# Patient Record
Sex: FEMALE | Race: WHITE | Employment: FULL TIME | URBAN - METROPOLITAN AREA
[De-identification: names, ages, dates, MRNs, and addresses within clinical notes are randomized per-mention and may not be internally consistent; named-entity substitution may affect disease eponyms.]

---

## 2022-07-24 LAB
CHOLESTEROL, TOTAL: 282 MG/DL (ref 100–199)
HDLC SERPL-MCNC: 73 MG/DL
LDL CHOLESTEROL CALCULATED: 190 MG/DL (ref 0–99)
TRIGL SERPL-MCNC: 108 MG/DL (ref 0–149)
TSH SERPL DL<=0.05 MIU/L-ACNC: 1.95 UIU/ML (ref 0.45–4.5)
VLDLC SERPL CALC-MCNC: 19 MG/DL (ref 5–40)

## 2022-07-26 ENCOUNTER — TELEPHONE (OUTPATIENT)
Dept: PULMONOLOGY | Age: 57
End: 2022-07-26

## 2022-07-26 NOTE — TELEPHONE ENCOUNTER
LVM 07/22/2022: NP call office back to schedule appt        LVM 02/26/22: NP referred by Dr. Vani Mijares from [facility] PCP

## 2022-07-29 ENCOUNTER — TELEPHONE (OUTPATIENT)
Dept: PULMONOLOGY | Age: 57
End: 2022-07-29

## 2022-07-29 NOTE — TELEPHONE ENCOUNTER
Pt called to schedule her new patient appointment and I informed her how far were scheduling out. Patient verbally understood and scheduled her for 09/19/2022 @ 11:30am.

## 2022-09-19 ENCOUNTER — INITIAL CONSULT (OUTPATIENT)
Dept: PULMONOLOGY | Age: 57
End: 2022-09-19
Payer: COMMERCIAL

## 2022-09-19 VITALS
WEIGHT: 203 LBS | BODY MASS INDEX: 33.82 KG/M2 | OXYGEN SATURATION: 96 % | DIASTOLIC BLOOD PRESSURE: 90 MMHG | SYSTOLIC BLOOD PRESSURE: 140 MMHG | HEART RATE: 93 BPM | HEIGHT: 65 IN

## 2022-09-19 DIAGNOSIS — E66.9 OBESITY (BMI 30-39.9): ICD-10-CM

## 2022-09-19 DIAGNOSIS — J84.9 ILD (INTERSTITIAL LUNG DISEASE) (HCC): ICD-10-CM

## 2022-09-19 DIAGNOSIS — R06.02 SOBOE (SHORTNESS OF BREATH ON EXERTION): ICD-10-CM

## 2022-09-19 PROCEDURE — 99204 OFFICE O/P NEW MOD 45 MIN: CPT | Performed by: INTERNAL MEDICINE

## 2022-09-19 RX ORDER — DULAGLUTIDE 0.75 MG/.5ML
0.75 INJECTION, SOLUTION SUBCUTANEOUS WEEKLY
COMMUNITY
Start: 2022-05-09

## 2022-09-19 RX ORDER — LISINOPRIL 5 MG/1
TABLET ORAL
COMMUNITY
Start: 2022-09-16

## 2022-09-19 RX ORDER — FUROSEMIDE 20 MG/1
TABLET ORAL
COMMUNITY
Start: 2022-06-16

## 2022-09-19 RX ORDER — HYDROCHLOROTHIAZIDE 12.5 MG/1
TABLET ORAL
COMMUNITY
Start: 2022-08-20

## 2022-09-19 ASSESSMENT — SLEEP AND FATIGUE QUESTIONNAIRES
HOW LIKELY ARE YOU TO NOD OFF OR FALL ASLEEP WHILE SITTING QUIETLY AFTER LUNCH WITHOUT ALCOHOL: 1
HOW LIKELY ARE YOU TO NOD OFF OR FALL ASLEEP WHILE SITTING AND READING: 1
HOW LIKELY ARE YOU TO NOD OFF OR FALL ASLEEP WHILE WATCHING TV: 1
HOW LIKELY ARE YOU TO NOD OFF OR FALL ASLEEP WHEN YOU ARE A PASSENGER IN A CAR FOR AN HOUR WITHOUT A BREAK: 0
ESS TOTAL SCORE: 4
HOW LIKELY ARE YOU TO NOD OFF OR FALL ASLEEP WHILE SITTING INACTIVE IN A PUBLIC PLACE: 0
HOW LIKELY ARE YOU TO NOD OFF OR FALL ASLEEP IN A CAR, WHILE STOPPED FOR A FEW MINUTES IN TRAFFIC: 0
HOW LIKELY ARE YOU TO NOD OFF OR FALL ASLEEP WHILE SITTING AND TALKING TO SOMEONE: 0
HOW LIKELY ARE YOU TO NOD OFF OR FALL ASLEEP WHILE LYING DOWN TO REST IN THE AFTERNOON WHEN CIRCUMSTANCES PERMIT: 1
NECK CIRCUMFERENCE (INCHES): 15.5

## 2022-09-19 ASSESSMENT — ENCOUNTER SYMPTOMS
SHORTNESS OF BREATH: 1
EYE DISCHARGE: 0
COUGH: 0
EYE ITCHING: 0
ABDOMINAL PAIN: 0
ABDOMINAL DISTENTION: 0

## 2022-09-19 NOTE — PROGRESS NOTES
Kristin Alexandre  1965  Referring Provider: Kelby Spurling, MD    Subjective:     Chief Complaint   Patient presents with    New Patient     ILD          HPI  Aurelia Arreguin is a 65 y.o. female has been referred for the ILD. She was recently in the hospital where according to the patient she had a CT chest which showed multifocal pneumonia with sepsis. It was in April 2022. She has h/o smoking. She works as Physical therapy Assistant. She has occasional cough, no phlegm, no hemoptysis, no loss of weight, good appetite, SOBOE some. She is not on oxygen. She is not on any inhalers. She is not sure if snores or stop breathing in the night. She goes to bed at 10 PM and gets up at 5 AM and she is not tired. She has no morning headaches. She has no EDS. Current Outpatient Medications   Medication Sig Dispense Refill    hydroCHLOROthiazide (HYDRODIURIL) 12.5 MG tablet TAKE 1 TABLET BY MOUTH ONCE DAILY      lisinopril (PRINIVIL;ZESTRIL) 5 MG tablet       Dulaglutide (TRULICITY) 5.11 MS/5.9CS SOPN Inject 0.75 mg into the skin once a week      furosemide (LASIX) 20 MG tablet TAKE 1 TABLET BY MOUTH ONCE DAILY (Patient not taking: Reported on 9/19/2022)      potassium chloride (KLOR-CON M) 20 MEQ extended release tablet  (Patient not taking: Reported on 9/19/2022)      atorvastatin (LIPITOR) 20 MG tablet  (Patient not taking: Reported on 9/19/2022)      losartan-hydrochlorothiazide (HYZAAR) 50-12.5 MG per tablet  (Patient not taking: Reported on 9/19/2022)      glimepiride (AMARYL) 2 MG tablet  (Patient not taking: Reported on 9/19/2022)      metFORMIN, MOD, (GLUMETZA) 1000 MG extended release tablet  (Patient not taking: Reported on 9/19/2022)       No current facility-administered medications for this visit. Allergies   Allergen Reactions    Nifedipine Shortness Of Breath    Penicillins Anaphylaxis    Metformin Other (See Comments)     Other reaction(s):  Other - comment required  Kidney function affected  Kidney function affected         Past Medical History:   Diagnosis Date    Hypertension     Type 2 diabetes mellitus without complication ([facility] [address]        Past Surgical History:   Procedure Laterality Date    COLONOSCOPY         Social History     Socioeconomic History    Marital status: Unknown   Tobacco Use    Smoking status: Never   Substance and Sexual Activity    Alcohol use: No       Review of Systems   Constitutional:  Negative for fatigue. HENT:  Negative for congestion and postnasal drip. Eyes:  Negative for discharge and itching. Respiratory:  Positive for shortness of breath. Negative for cough. Cardiovascular:  Negative for chest pain and leg swelling. Gastrointestinal:  Negative for abdominal distention and abdominal pain. Endocrine: Negative for cold intolerance and heat intolerance. Genitourinary:  Negative for enuresis and frequency. Musculoskeletal:  Negative for arthralgias. Allergic/Immunologic: Negative for environmental allergies and food allergies. Neurological:  Negative for light-headedness and headaches. Hematological:  Negative for adenopathy. Psychiatric/Behavioral:  Negative for agitation and behavioral problems. Objective:   BP (!) 148/100   Pulse 93   Ht 5' 5\" (1.651 m)   Wt 203 lb (92.1 kg)   SpO2 96%   BMI 33.78 kg/m²   Body mass index is 33.78 kg/m². Sleep Medicine 9/19/2022   Sitting and reading 1   Watching TV 1   Sitting, inactive in a public place (e.g. a theatre or a meeting) 0   As a passenger in a car for an hour without a break 0   Lying down to rest in the afternoon when circumstances permit 1   Sitting and talking to someone 0   Sitting quietly after a lunch without alcohol 1   In a car, while stopped for a few minutes in traffic 0   [place] Sleepiness Score 4   Neck circumference (Inches) 15.5     Mallampati 3    Physical Exam  Vitals reviewed. Constitutional:       Appearance: Normal appearance. HENT:      Head: Normocephalic and atraumatic.

## 2022-10-20 ENCOUNTER — HOSPITAL ENCOUNTER (OUTPATIENT)
Dept: CT IMAGING | Age: 57
Discharge: HOME | End: 2022-10-20
Payer: COMMERCIAL

## 2022-10-20 ENCOUNTER — HOSPITAL ENCOUNTER (OUTPATIENT)
Dept: PULMONOLOGY | Age: 57
Discharge: HOME | End: 2022-10-20
Payer: COMMERCIAL

## 2022-10-20 DIAGNOSIS — J84.9 ILD (INTERSTITIAL LUNG DISEASE) (HCC): ICD-10-CM

## 2022-10-20 DIAGNOSIS — R06.02 SOBOE (SHORTNESS OF BREATH ON EXERTION): ICD-10-CM

## 2022-10-20 LAB
DISTANCE WALKED: 885 FT
DLCO %PRED: 68 %
DLCO PRED: NORMAL
DLCO/VA %PRED: NORMAL
DLCO/VA PRED: NORMAL
DLCO/VA: NORMAL
DLCO: NORMAL
EXPIRATORY TIME-POST: NORMAL
EXPIRATORY TIME: NORMAL
FEF 25-75% %CHNG: NORMAL
FEF 25-75% %PRED-POST: NORMAL
FEF 25-75% %PRED-PRE: NORMAL
FEF 25-75% PRED: NORMAL
FEF 25-75%-POST: NORMAL
FEF 25-75%-PRE: NORMAL
FEV1 %PRED-POST: 52 %
FEV1 %PRED-PRE: 46 %
FEV1 PRED: NORMAL
FEV1-POST: NORMAL
FEV1-PRE: NORMAL
FEV1/FVC %PRED-POST: NORMAL
FEV1/FVC %PRED-PRE: NORMAL
FEV1/FVC PRED: NORMAL
FEV1/FVC-POST: 92 %
FEV1/FVC-PRE: 87 %
FVC %PRED-POST: NORMAL
FVC %PRED-PRE: NORMAL
FVC PRED: NORMAL
FVC-POST: NORMAL
FVC-PRE: NORMAL
GAW %PRED: NORMAL
GAW PRED: NORMAL
GAW: NORMAL
IC %PRED: NORMAL
IC PRED: NORMAL
IC: NORMAL
MEP: NORMAL
MIP: NORMAL
MVV %PRED-PRE: NORMAL
MVV PRED: NORMAL
MVV-PRE: NORMAL
PEF %PRED-POST: NORMAL
PEF %PRED-PRE: NORMAL
PEF PRED: NORMAL
PEF%CHNG: NORMAL
PEF-POST: NORMAL
PEF-PRE: NORMAL
RAW %PRED: NORMAL
RAW PRED: NORMAL
RAW: NORMAL
RV %PRED: NORMAL
RV PRED: NORMAL
RV: NORMAL
SPO2: 94 %
SVC %PRED: NORMAL
SVC PRED: NORMAL
SVC: NORMAL
TLC %PRED: 51 %
TLC PRED: NORMAL
TLC: NORMAL
VA %PRED: NORMAL
VA PRED: NORMAL
VA: NORMAL
VTG %PRED: NORMAL
VTG PRED: NORMAL
VTG: NORMAL

## 2022-10-20 PROCEDURE — 71250 CT THORAX DX C-: CPT

## 2022-10-20 PROCEDURE — 94618 PULMONARY STRESS TESTING: CPT

## 2022-10-20 PROCEDURE — 94060 EVALUATION OF WHEEZING: CPT

## 2022-10-20 PROCEDURE — 94726 PLETHYSMOGRAPHY LUNG VOLUMES: CPT

## 2022-10-20 PROCEDURE — 94729 DIFFUSING CAPACITY: CPT

## 2022-10-20 ASSESSMENT — PULMONARY FUNCTION TESTS
FEV1/FVC_PRE: 87
FEV1/FVC_POST: 92
FEV1_PERCENT_PREDICTED_PRE: 46
FEV1_PERCENT_PREDICTED_POST: 52

## 2022-10-20 NOTE — PROGRESS NOTES
[facility] Pulmonary Function Lab - Six Minute Walk  Test Performed on: Room Air_X__ Oxygen at _____ lpm via N/C  Assist Device Used During Test:    None__X__ Cane____ Walker___   Shortness of Breath - Lorna's Scale  0 Nothing at all 5 Severe    0.5 Very very slight 6    1 Very slight 7 Very severe   2 Slight 8     3 Moderate 9 Very very severe   4 Somewhat severe 10 Maximal      Time SpO2 Heart Rate Respiratory Rate Modified Lornas Scale Other      Baseline      94           %  60 20 0           1 minute                 96            % 107   1           2 minutes           95            %  122  2           3 minutes           94             %  136     3           4 minutes           94           %  139      3           5 minutes     95             %  143      4           6 minutes        93             %  140     4        Recovery   x 1 Min              93            %  126 30  3           Recovery   x 2 Min              97             %  117       2         Number of Laps_5_ X 170 feet _850__+ _35_ additional feet = Total _885_feet  Stopped or paused before 6 minutes?  No__X__ Yes _____   Pre Blood Pressure: _196_ / _95__    Post Blood Pressure:_ 239 _/_119__  Interpretation:

## 2022-11-02 ENCOUNTER — OFFICE VISIT (OUTPATIENT)
Dept: PULMONOLOGY | Age: 57
End: 2022-11-02
Payer: COMMERCIAL

## 2022-11-02 VITALS
SYSTOLIC BLOOD PRESSURE: 158 MMHG | OXYGEN SATURATION: 97 % | DIASTOLIC BLOOD PRESSURE: 98 MMHG | WEIGHT: 211 LBS | HEART RATE: 95 BPM | BODY MASS INDEX: 35.16 KG/M2 | HEIGHT: 65 IN

## 2022-11-02 DIAGNOSIS — E66.9 OBESITY (BMI 30-39.9): ICD-10-CM

## 2022-11-02 DIAGNOSIS — J84.9 ILD (INTERSTITIAL LUNG DISEASE) (HCC): ICD-10-CM

## 2022-11-02 DIAGNOSIS — R06.02 SOBOE (SHORTNESS OF BREATH ON EXERTION): Primary | ICD-10-CM

## 2022-11-02 DIAGNOSIS — I27.20 PULMONARY HYPERTENSION (HCC): ICD-10-CM

## 2022-11-02 PROCEDURE — 99214 OFFICE O/P EST MOD 30 MIN: CPT | Performed by: INTERNAL MEDICINE

## 2022-11-02 RX ORDER — HYDRALAZINE HYDROCHLORIDE 25 MG/1
TABLET, FILM COATED ORAL
COMMUNITY
Start: 2022-11-01

## 2022-11-02 ASSESSMENT — ENCOUNTER SYMPTOMS
BACK PAIN: 0
COUGH: 0
SHORTNESS OF BREATH: 0
ABDOMINAL DISTENTION: 0
EYE DISCHARGE: 0
EYE ITCHING: 0
ABDOMINAL PAIN: 0

## 2022-11-02 NOTE — PROGRESS NOTES
Gerson Jacky  1965  Referring Provider: Nasrin Messina MD    Subjective:     Chief Complaint   Patient presents with    Results       HPI  Mt Oropeza is a 65 y.o. female has come back as a follow up. She had suspected ILD. She has minimal symptoms. She had a HRCT of chest done on 10/20/22 showed:    1. There are dense bibasilar ground-glass opacities with associated   interlobular septal thickening. Given additional findings of mild   cardiomegaly and trace bilateral pleural effusions, findings are suggestive   of pulmonary edema. Prior imaging is not available for comparison, however   given reported history of bilateral consolidations dating back to 04/25/2022   CTA chest, these findings could also represent recurrent multifocal pneumonia   or NSIP, although not classic. Less likely differential consideration is   desquamative interstitial pneumonia (no significant cyst formation). 2.  Moderate body wall edema and diffuse skin thickening, compatible with   volume overload. 3.  The main pulmonary artery is dilated measuring up to 3.5 cm, which can be   seen in pulmonary hypertension. 4.  Nonspecific mildly prominent right axillary lymph nodes     She is on Lasix and HCTZ. Her PFT done on 10/20/22 showed that she has restrictive lung disease with a TLC of 51% and DLCO is 68%. Her 6 MWT done showed good walking distance and no desaturation. Current Outpatient Medications   Medication Sig Dispense Refill    hydrALAZINE (APRESOLINE) 25 MG tablet TAKE 1 TABLET BY MOUTH 3 TIMES A DAY.       furosemide (LASIX) 20 MG tablet       lisinopril (PRINIVIL;ZESTRIL) 5 MG tablet       Dulaglutide (TRULICITY) 5.83 UZ/5.2DN SOPN Inject 0.75 mg into the skin once a week      atorvastatin (LIPITOR) 20 MG tablet       hydroCHLOROthiazide (HYDRODIURIL) 12.5 MG tablet TAKE 1 TABLET BY MOUTH ONCE DAILY (Patient not taking: Reported on 11/2/2022)      potassium chloride (KLOR-CON M) 20 MEQ extended release tablet  (Patient not taking: No sig reported)      losartan-hydrochlorothiazide (HYZAAR) 50-12.5 MG per tablet  (Patient not taking: No sig reported)      glimepiride (AMARYL) 2 MG tablet  (Patient not taking: No sig reported)      metFORMIN, MOD, (GLUMETZA) 1000 MG extended release tablet  (Patient not taking: No sig reported)       No current facility-administered medications for this visit. Allergies   Allergen Reactions    Nifedipine Shortness Of Breath    Penicillins Anaphylaxis    Metformin Other (See Comments)     Other reaction(s): Other - comment required  Kidney function affected  Kidney function affected         Past Medical History:   Diagnosis Date    Hypertension     Type 2 diabetes mellitus without complication ([facility] [address]        Past Surgical History:   Procedure Laterality Date    COLONOSCOPY         Social History     Socioeconomic History    Marital status: Unknown     Spouse name: None    Number of children: None    Years of education: None    Highest education level: None   Tobacco Use    Smoking status: Never    Smokeless tobacco: Never   Substance and Sexual Activity    Alcohol use: No       Review of Systems   Constitutional:  Negative for fatigue. HENT:  Negative for congestion and postnasal drip. Eyes:  Negative for discharge and itching. Respiratory:  Negative for cough and shortness of breath. Cardiovascular:  Negative for chest pain and leg swelling. Gastrointestinal:  Negative for abdominal distention and abdominal pain. Endocrine: Negative for cold intolerance and heat intolerance. Genitourinary:  Negative for enuresis and frequency. Musculoskeletal:  Negative for arthralgias and back pain. Allergic/Immunologic: Negative for environmental allergies and food allergies. Neurological:  Negative for light-headedness and headaches. Hematological:  Negative for adenopathy. Psychiatric/Behavioral:  Negative for agitation and behavioral problems.       Objective:   BP (!) 184/110   Pulse 95   Ht 5' 5\" (1.651 m)   Wt 211 lb (95.7 kg)   SpO2 97%   BMI 35.11 kg/m²   Body mass index is 35.11 kg/m². Sleep Medicine 9/19/2022   Sitting and reading 1   Watching TV 1   Sitting, inactive in a public place (e.g. a theatre or a meeting) 0   As a passenger in a car for an hour without a break 0   Lying down to rest in the afternoon when circumstances permit 1   Sitting and talking to someone 0   Sitting quietly after a lunch without alcohol 1   In a car, while stopped for a few minutes in traffic 0   [place] Sleepiness Score 4   Neck circumference (Inches) 15.5     Mallampati 3    Physical Exam  Vitals reviewed. Constitutional:       Appearance: Normal appearance. HENT:      Head: Normocephalic and atraumatic. Nose: Nose normal.      Mouth/Throat:      Mouth: Mucous membranes are moist.   Eyes:      Extraocular Movements: Extraocular movements intact. Pupils: Pupils are equal, round, and reactive to light. Cardiovascular:      Rate and Rhythm: Normal rate and regular rhythm. Pulses: Normal pulses. Heart sounds: Normal heart sounds. Pulmonary:      Effort: Pulmonary effort is normal.      Breath sounds: Normal breath sounds. Abdominal:      General: Abdomen is flat. Palpations: Abdomen is soft. Musculoskeletal:         General: Normal range of motion. Cervical back: Normal range of motion and neck supple. Skin:     General: Skin is warm and dry. Neurological:      General: No focal deficit present. Mental Status: She is alert and oriented to person, place, and time.    Psychiatric:         Mood and Affect: Mood normal.         Behavior: Behavior normal.       Radiology: None    Assessment and Plan     Problem List          Circulatory    Pulmonary hypertension ([address]      At this time it appears to be sec to the ILD and possible diastolic dysfunction  I need an ECHO            Respiratory    ILD (interstitial lung disease) ([address] Appears to be NSIP with minimla symptoms  Repeat HRCT of chest in 1 year  PFT and a 6 MWT in 1 year  Advised ECHO now         Relevant Orders    Full PFT Study With Bronchodilator    6 Minute Walk Test       Other    Obesity (BMI 30-39. 9)      Advised to loose weight with diet and exercise           Relevant Medications    glimepiride (AMARYL) 2 MG tablet    metFORMIN, MOD, (GLUMETZA) 1000 MG extended release tablet    Dulaglutide (TRULICITY) 8.61 UM/0.8ZB SOPN    SOBOE (shortness of breath on exertion) - Primary      Minimal with no mdesaturation         Relevant Orders    ECHO Complete 2D W Doppler W Color    Full PFT Study With Bronchodilator    6 Minute Walk Test    CT CHEST WO CONTRAST            Follow-Up:    Return in about 4 weeks (around 11/30/2022) for ECHO.      Progress notes sent to the referring Provider    Mj Marino MD MD  11/2/2022  11:06 AM

## 2022-11-02 NOTE — ASSESSMENT & PLAN NOTE
Appears to be NSIP with minimla symptoms  Repeat HRCT of chest in 1 year  PFT and a 6 MWT in 1 year  Advised ECHO now

## 2022-11-18 ENCOUNTER — TELEPHONE (OUTPATIENT)
Dept: CARDIOLOGY CLINIC | Age: 57
End: 2022-11-18

## 2022-11-21 ENCOUNTER — TELEPHONE (OUTPATIENT)
Dept: CARDIOLOGY CLINIC | Age: 57
End: 2022-11-21

## 2022-11-21 NOTE — TELEPHONE ENCOUNTER
Left message for patient requesting a return call to schedule a consult with Himanshu for pulmonary hypertension per referral from [facility].

## 2022-11-23 ENCOUNTER — TELEPHONE (OUTPATIENT)
Dept: CARDIOLOGY CLINIC | Age: 57
End: 2022-11-23

## 2022-11-23 NOTE — TELEPHONE ENCOUNTER
Left message for patient requesting a return call to schedule a consult with Himanshu for pulmonary hypertension per referral from [facility].

## 2022-12-02 LAB — MAMMOGRAPHY, EXTERNAL: NORMAL

## 2022-12-21 PROBLEM — N17.9 ACUTE KIDNEY FAILURE, UNSPECIFIED (HCC): Status: ACTIVE | Noted: 2022-12-21

## 2022-12-21 PROBLEM — R80.1 PERSISTENT PROTEINURIA: Status: ACTIVE | Noted: 2022-12-21

## 2022-12-21 PROBLEM — I15.9 SECONDARY HYPERTENSION: Status: ACTIVE | Noted: 2022-12-21

## 2022-12-21 PROBLEM — N18.32 STAGE 3B CHRONIC KIDNEY DISEASE (HCC): Status: ACTIVE | Noted: 2022-12-21

## 2022-12-29 ENCOUNTER — HOSPITAL ENCOUNTER (OUTPATIENT)
Dept: ULTRASOUND IMAGING | Age: 57
Discharge: HOME | End: 2022-12-29
Payer: COMMERCIAL

## 2022-12-29 DIAGNOSIS — N17.9 ACUTE RENAL FAILURE, UNSPECIFIED ACUTE RENAL FAILURE TYPE (HCC): ICD-10-CM

## 2022-12-29 DIAGNOSIS — R80.1 PERSISTENT PROTEINURIA: ICD-10-CM

## 2022-12-29 DIAGNOSIS — N18.32 STAGE 3B CHRONIC KIDNEY DISEASE (HCC): ICD-10-CM

## 2022-12-29 PROCEDURE — 76775 US EXAM ABDO BACK WALL LIM: CPT

## 2023-10-10 PROBLEM — N18.32 TYPE 2 DIABETES MELLITUS WITH STAGE 3B CHRONIC KIDNEY DISEASE, WITHOUT LONG-TERM CURRENT USE OF INSULIN (HCC): Status: ACTIVE | Noted: 2023-10-10

## 2023-10-10 PROBLEM — E11.22 TYPE 2 DIABETES MELLITUS WITH STAGE 3B CHRONIC KIDNEY DISEASE, WITHOUT LONG-TERM CURRENT USE OF INSULIN (HCC): Status: ACTIVE | Noted: 2023-10-10

## 2023-12-05 PROBLEM — N04.9 NEPHROTIC SYNDROME: Status: ACTIVE | Noted: 2023-12-05

## 2024-04-22 ENCOUNTER — OFFICE VISIT (OUTPATIENT)
Dept: INTERNAL MEDICINE CLINIC | Age: 59
End: 2024-04-22
Payer: COMMERCIAL

## 2024-04-22 VITALS
HEIGHT: 65 IN | BODY MASS INDEX: 30.82 KG/M2 | DIASTOLIC BLOOD PRESSURE: 70 MMHG | SYSTOLIC BLOOD PRESSURE: 152 MMHG | WEIGHT: 185 LBS | OXYGEN SATURATION: 98 % | HEART RATE: 67 BPM

## 2024-04-22 DIAGNOSIS — N18.32 TYPE 2 DIABETES MELLITUS WITH STAGE 3B CHRONIC KIDNEY DISEASE, WITHOUT LONG-TERM CURRENT USE OF INSULIN (HCC): Primary | ICD-10-CM

## 2024-04-22 DIAGNOSIS — Z00.00 GENERAL MEDICAL EXAM: ICD-10-CM

## 2024-04-22 DIAGNOSIS — E11.22 TYPE 2 DIABETES MELLITUS WITH STAGE 3B CHRONIC KIDNEY DISEASE, WITHOUT LONG-TERM CURRENT USE OF INSULIN (HCC): Primary | ICD-10-CM

## 2024-04-22 DIAGNOSIS — I15.9 SECONDARY HYPERTENSION: ICD-10-CM

## 2024-04-22 DIAGNOSIS — Z12.31 ENCOUNTER FOR SCREENING MAMMOGRAM FOR MALIGNANT NEOPLASM OF BREAST: ICD-10-CM

## 2024-04-22 DIAGNOSIS — N18.32 STAGE 3B CHRONIC KIDNEY DISEASE (HCC): ICD-10-CM

## 2024-04-22 DIAGNOSIS — E78.5 HYPERLIPIDEMIA, UNSPECIFIED HYPERLIPIDEMIA TYPE: ICD-10-CM

## 2024-04-22 PROBLEM — N17.9 ACUTE KIDNEY FAILURE, UNSPECIFIED (HCC): Status: RESOLVED | Noted: 2022-12-21 | Resolved: 2024-04-22

## 2024-04-22 PROCEDURE — 99203 OFFICE O/P NEW LOW 30 MIN: CPT | Performed by: FAMILY MEDICINE

## 2024-04-22 RX ORDER — BLOOD SUGAR DIAGNOSTIC
STRIP MISCELLANEOUS
COMMUNITY
Start: 2024-03-21

## 2024-04-22 SDOH — ECONOMIC STABILITY: HOUSING INSECURITY
IN THE LAST 12 MONTHS, WAS THERE A TIME WHEN YOU DID NOT HAVE A STEADY PLACE TO SLEEP OR SLEPT IN A SHELTER (INCLUDING NOW)?: NO

## 2024-04-22 SDOH — ECONOMIC STABILITY: FOOD INSECURITY: WITHIN THE PAST 12 MONTHS, THE FOOD YOU BOUGHT JUST DIDN'T LAST AND YOU DIDN'T HAVE MONEY TO GET MORE.: NEVER TRUE

## 2024-04-22 SDOH — ECONOMIC STABILITY: FOOD INSECURITY: WITHIN THE PAST 12 MONTHS, YOU WORRIED THAT YOUR FOOD WOULD RUN OUT BEFORE YOU GOT MONEY TO BUY MORE.: NEVER TRUE

## 2024-04-22 SDOH — ECONOMIC STABILITY: INCOME INSECURITY: HOW HARD IS IT FOR YOU TO PAY FOR THE VERY BASICS LIKE FOOD, HOUSING, MEDICAL CARE, AND HEATING?: NOT HARD AT ALL

## 2024-04-22 ASSESSMENT — ENCOUNTER SYMPTOMS
SORE THROAT: 0
SHORTNESS OF BREATH: 0
ABDOMINAL PAIN: 0
CHEST TIGHTNESS: 0
CONSTIPATION: 0
DIARRHEA: 0
COLOR CHANGE: 0
VOMITING: 0

## 2024-04-22 ASSESSMENT — PATIENT HEALTH QUESTIONNAIRE - PHQ9
SUM OF ALL RESPONSES TO PHQ QUESTIONS 1-9: 0
SUM OF ALL RESPONSES TO PHQ QUESTIONS 1-9: 0
SUM OF ALL RESPONSES TO PHQ9 QUESTIONS 1 & 2: 0
SUM OF ALL RESPONSES TO PHQ QUESTIONS 1-9: 0
2. FEELING DOWN, DEPRESSED OR HOPELESS: NOT AT ALL
SUM OF ALL RESPONSES TO PHQ QUESTIONS 1-9: 0
1. LITTLE INTEREST OR PLEASURE IN DOING THINGS: NOT AT ALL

## 2024-04-22 NOTE — PROGRESS NOTES
Subjective:      Chief Complaint   Patient presents with    New Patient       HPI:  Angela Pabon is a 59 y.o. female who presents today to establish care with new provider and for management of chronic medical conditions as below.    HTN:  Is currently taking HCTZ, hydralazine and clonidine.  Has been tried on several medications.  States she has a history of extremely elevated HTN- was initially 200's.  States occasionally she gets a reading in 120-130's but are usually 150's.  Is following up with nephrology in a few weeks.      DM:  Is taking farxiga, glipizide and trulicity.  States she occasionally has hypoglycemia about once a month (60-70's).  Take glipizide once daily, but takes an extra half tab in evening occasionally if glucose reading is high.  Fasting glucose is usually 130-160.   States she used to take metformin but was discontinued 2/2 renal disease.    Last colonoscopy was at [facility], but does not remember the provider.  States results were normal.  Denies family history of colon cancer.     Last mammogram was last year.      Not seeing any specialists other than nephrology.     Feeling well today, no acute concerns.  Labs reviewed.        Past Medical History:   Diagnosis Date    Acute hypoxemic respiratory failure (HCC)     Essential hypertension     Human metapneumovirus (hMPV) pneumonia     Hypertension     Obesity     Sepsis (HCC)     Type 2 diabetes mellitus without complication (HCC)         Past Surgical History:   Procedure Laterality Date    BREAST BIOPSY      COLONOSCOPY      WISDOM TOOTH EXTRACTION         Social History     Tobacco Use    Smoking status: Never    Smokeless tobacco: Never   Substance Use Topics    Alcohol use: No        Review of Systems   Constitutional:  Negative for activity change, appetite change, chills, fever and unexpected weight change.   HENT:  Negative for congestion and sore throat.    Respiratory:  Negative for chest tightness and shortness of breath.

## 2024-05-23 PROBLEM — N18.4 CKD (CHRONIC KIDNEY DISEASE) STAGE 4, GFR 15-29 ML/MIN (HCC): Status: ACTIVE | Noted: 2024-05-23

## 2024-08-07 ENCOUNTER — TELEPHONE (OUTPATIENT)
Dept: INTERNAL MEDICINE CLINIC | Age: 59
End: 2024-08-07

## 2024-08-07 NOTE — TELEPHONE ENCOUNTER
Got a call from Joycelyn with [facility], need new script for patients [facility] [employer] [facility]ario sent over.

## 2024-08-09 RX ORDER — BLOOD SUGAR DIAGNOSTIC
STRIP MISCELLANEOUS
Qty: 100 EACH | Refills: 2 | Status: SHIPPED | OUTPATIENT
Start: 2024-08-09

## 2025-04-16 RX ORDER — ATORVASTATIN CALCIUM 80 MG/1
80 TABLET, FILM COATED ORAL DAILY
Qty: 90 TABLET | Refills: 1 | Status: SHIPPED | OUTPATIENT
Start: 2025-04-16

## 2025-05-06 ENCOUNTER — OFFICE VISIT (OUTPATIENT)
Dept: INTERNAL MEDICINE CLINIC | Age: 60
End: 2025-05-06
Payer: COMMERCIAL

## 2025-05-06 VITALS
HEART RATE: 90 BPM | OXYGEN SATURATION: 95 % | SYSTOLIC BLOOD PRESSURE: 156 MMHG | WEIGHT: 245 LBS | BODY MASS INDEX: 40.77 KG/M2 | DIASTOLIC BLOOD PRESSURE: 70 MMHG

## 2025-05-06 DIAGNOSIS — N18.32 TYPE 2 DIABETES MELLITUS WITH STAGE 3B CHRONIC KIDNEY DISEASE, WITHOUT LONG-TERM CURRENT USE OF INSULIN (HCC): Primary | ICD-10-CM

## 2025-05-06 DIAGNOSIS — Z12.31 ENCOUNTER FOR SCREENING MAMMOGRAM FOR MALIGNANT NEOPLASM OF BREAST: ICD-10-CM

## 2025-05-06 DIAGNOSIS — R60.1 GENERALIZED EDEMA: ICD-10-CM

## 2025-05-06 DIAGNOSIS — E78.5 HYPERLIPIDEMIA, UNSPECIFIED HYPERLIPIDEMIA TYPE: ICD-10-CM

## 2025-05-06 DIAGNOSIS — E55.9 VITAMIN D DEFICIENCY: ICD-10-CM

## 2025-05-06 DIAGNOSIS — I15.9 SECONDARY HYPERTENSION: ICD-10-CM

## 2025-05-06 DIAGNOSIS — N18.32 STAGE 3B CHRONIC KIDNEY DISEASE (HCC): ICD-10-CM

## 2025-05-06 DIAGNOSIS — E11.22 TYPE 2 DIABETES MELLITUS WITH STAGE 3B CHRONIC KIDNEY DISEASE, WITHOUT LONG-TERM CURRENT USE OF INSULIN (HCC): Primary | ICD-10-CM

## 2025-05-06 LAB
25(OH)D3 SERPL-MCNC: 6 NG/ML
ALBUMIN SERPL-MCNC: 2.7 G/DL (ref 3.4–5)
ALBUMIN/GLOB SERPL: 1.3 {RATIO} (ref 1.1–2.2)
ALP SERPL-CCNC: 100 U/L (ref 40–129)
ALT SERPL-CCNC: 22 U/L (ref 10–40)
ANION GAP SERPL CALCULATED.3IONS-SCNC: 10 MMOL/L (ref 3–16)
AST SERPL-CCNC: 31 U/L (ref 15–37)
BASOPHILS # BLD: 0 K/UL (ref 0–0.2)
BASOPHILS NFR BLD: 1.1 %
BILIRUB SERPL-MCNC: 0.7 MG/DL (ref 0–1)
BUN SERPL-MCNC: 25 MG/DL (ref 7–20)
CALCIUM SERPL-MCNC: 8.2 MG/DL (ref 8.3–10.6)
CHLORIDE SERPL-SCNC: 114 MMOL/L (ref 99–110)
CHOLEST SERPL-MCNC: 187 MG/DL (ref 0–199)
CO2 SERPL-SCNC: 21 MMOL/L (ref 21–32)
CREAT SERPL-MCNC: 2.7 MG/DL (ref 0.6–1.1)
DEPRECATED RDW RBC AUTO: 16.1 % (ref 12.4–15.4)
EOSINOPHIL # BLD: 0 K/UL (ref 0–0.6)
EOSINOPHIL NFR BLD: 0.7 %
GFR SERPLBLD CREATININE-BSD FMLA CKD-EPI: 20 ML/MIN/{1.73_M2}
GLUCOSE SERPL-MCNC: 124 MG/DL (ref 70–99)
HBA1C MFR BLD: 5 %
HCT VFR BLD AUTO: 27.9 % (ref 36–48)
HDLC SERPL-MCNC: 63 MG/DL (ref 40–60)
HGB BLD-MCNC: 9.4 G/DL (ref 12–16)
LDLC SERPL CALC-MCNC: 108 MG/DL
LYMPHOCYTES # BLD: 0.3 K/UL (ref 1–5.1)
LYMPHOCYTES NFR BLD: 8.3 %
MCH RBC QN AUTO: 28.1 PG (ref 26–34)
MCHC RBC AUTO-ENTMCNC: 33.6 G/DL (ref 31–36)
MCV RBC AUTO: 83.7 FL (ref 80–100)
MONOCYTES # BLD: 0.3 K/UL (ref 0–1.3)
MONOCYTES NFR BLD: 8.6 %
NEUTROPHILS # BLD: 3.2 K/UL (ref 1.7–7.7)
NEUTROPHILS NFR BLD: 81.3 %
NT-PROBNP SERPL-MCNC: ABNORMAL PG/ML (ref 0–124)
PLATELET # BLD AUTO: 265 K/UL (ref 135–450)
PMV BLD AUTO: 8.9 FL (ref 5–10.5)
POTASSIUM SERPL-SCNC: 4 MMOL/L (ref 3.5–5.1)
PROT SERPL-MCNC: 4.8 G/DL (ref 6.4–8.2)
RBC # BLD AUTO: 3.34 M/UL (ref 4–5.2)
SODIUM SERPL-SCNC: 145 MMOL/L (ref 136–145)
TRIGL SERPL-MCNC: 81 MG/DL (ref 0–150)
TSH SERPL DL<=0.005 MIU/L-ACNC: 5.73 UIU/ML (ref 0.27–4.2)
VLDLC SERPL CALC-MCNC: 16 MG/DL
WBC # BLD AUTO: 3.9 K/UL (ref 4–11)

## 2025-05-06 PROCEDURE — G2211 COMPLEX E/M VISIT ADD ON: HCPCS | Performed by: FAMILY MEDICINE

## 2025-05-06 PROCEDURE — 3044F HG A1C LEVEL LT 7.0%: CPT | Performed by: FAMILY MEDICINE

## 2025-05-06 PROCEDURE — 83036 HEMOGLOBIN GLYCOSYLATED A1C: CPT | Performed by: FAMILY MEDICINE

## 2025-05-06 PROCEDURE — 99215 OFFICE O/P EST HI 40 MIN: CPT | Performed by: FAMILY MEDICINE

## 2025-05-06 PROCEDURE — 36415 COLL VENOUS BLD VENIPUNCTURE: CPT | Performed by: FAMILY MEDICINE

## 2025-05-06 RX ORDER — HYDROCHLOROTHIAZIDE 12.5 MG/1
12.5 TABLET ORAL DAILY
Qty: 90 TABLET | Refills: 1 | Status: SHIPPED | OUTPATIENT
Start: 2025-05-06 | End: 2026-05-01

## 2025-05-06 RX ORDER — CLONIDINE HYDROCHLORIDE 0.1 MG/1
0.1 TABLET ORAL DAILY
Qty: 90 TABLET | Refills: 1 | Status: SHIPPED | OUTPATIENT
Start: 2025-05-06 | End: 2025-11-02

## 2025-05-06 RX ORDER — DAPAGLIFLOZIN 10 MG/1
10 TABLET, FILM COATED ORAL EVERY MORNING
Qty: 90 TABLET | Refills: 1 | Status: SHIPPED | OUTPATIENT
Start: 2025-05-06 | End: 2025-11-02

## 2025-05-06 RX ORDER — ATORVASTATIN CALCIUM 80 MG/1
80 TABLET, FILM COATED ORAL DAILY
Qty: 90 TABLET | Refills: 1 | Status: SHIPPED | OUTPATIENT
Start: 2025-05-06

## 2025-05-06 RX ORDER — HYDRALAZINE HYDROCHLORIDE 100 MG/1
100 TABLET, FILM COATED ORAL 2 TIMES DAILY
Qty: 180 TABLET | Refills: 1 | Status: SHIPPED | OUTPATIENT
Start: 2025-05-06 | End: 2026-05-01

## 2025-05-06 SDOH — ECONOMIC STABILITY: FOOD INSECURITY: WITHIN THE PAST 12 MONTHS, THE FOOD YOU BOUGHT JUST DIDN'T LAST AND YOU DIDN'T HAVE MONEY TO GET MORE.: NEVER TRUE

## 2025-05-06 SDOH — ECONOMIC STABILITY: FOOD INSECURITY: WITHIN THE PAST 12 MONTHS, YOU WORRIED THAT YOUR FOOD WOULD RUN OUT BEFORE YOU GOT MONEY TO BUY MORE.: NEVER TRUE

## 2025-05-06 ASSESSMENT — ENCOUNTER SYMPTOMS
CONSTIPATION: 0
ABDOMINAL PAIN: 0
VOMITING: 0
COLOR CHANGE: 0
CHEST TIGHTNESS: 0
SORE THROAT: 0
DIARRHEA: 0
SHORTNESS OF BREATH: 0

## 2025-05-06 ASSESSMENT — PATIENT HEALTH QUESTIONNAIRE - PHQ9
SUM OF ALL RESPONSES TO PHQ QUESTIONS 1-9: 0
1. LITTLE INTEREST OR PLEASURE IN DOING THINGS: NOT AT ALL
SUM OF ALL RESPONSES TO PHQ QUESTIONS 1-9: 0
SUM OF ALL RESPONSES TO PHQ QUESTIONS 1-9: 0
2. FEELING DOWN, DEPRESSED OR HOPELESS: NOT AT ALL
SUM OF ALL RESPONSES TO PHQ QUESTIONS 1-9: 0

## 2025-05-06 NOTE — PROGRESS NOTES
Physical Exam  Vitals and nursing note reviewed.   Constitutional:       General: She is not in acute distress.     Appearance: Normal appearance. She is obese. She is not ill-appearing or toxic-appearing.      Comments: Generalized non pitting edema in upper and lower extremities, face   HENT:      Head: Normocephalic and atraumatic.      Right Ear: External ear normal.      Left Ear: External ear normal.      Nose: Nose normal.      Mouth/Throat:      Pharynx: Oropharynx is clear.   Eyes:      General: No scleral icterus.        Right eye: No discharge.         Left eye: No discharge.      Extraocular Movements: Extraocular movements intact.      Conjunctiva/sclera: Conjunctivae normal.   Cardiovascular:      Rate and Rhythm: Normal rate and regular rhythm.      Heart sounds: Normal heart sounds.   Pulmonary:      Effort: Pulmonary effort is normal.      Breath sounds: Normal breath sounds. No wheezing or rales.   Musculoskeletal:         General: No deformity.      Cervical back: Normal range of motion and neck supple. No rigidity.   Skin:     General: Skin is warm and dry.      Findings: No rash.   Neurological:      General: No focal deficit present.      Mental Status: She is alert. Mental status is at baseline.      Motor: No weakness.   Psychiatric:         Mood and Affect: Mood normal.         Behavior: Behavior normal.            Assessment / Plan:      1. Type 2 diabetes mellitus with stage 3b chronic kidney disease, without long-term current use of insulin ([facility])  POC HbA1c 5.0 today.  Patient reporting regular hypoglycemia, will discontinue glipizide.  Will restart farxiga for CKD as patient not taking metformin or insulin- should have minimal risk of hypoglycemia.  Continue monitoring glucose regularly.    - Albumin/Creatinine Ratio, Urine  - POCT glycosylated hemoglobin (Hb A1C)  - dapagliflozin (FARXIGA) 10 MG tablet; Take 1 tablet by mouth every morning  Dispense: 90 tablet; Refill: 1    2.

## 2025-05-08 ENCOUNTER — TELEPHONE (OUTPATIENT)
Dept: INTERNAL MEDICINE CLINIC | Age: 60
End: 2025-05-08

## 2025-05-08 ENCOUNTER — RESULTS FOLLOW-UP (OUTPATIENT)
Dept: INTERNAL MEDICINE CLINIC | Age: 60
End: 2025-05-08

## 2025-05-08 DIAGNOSIS — R06.02 SHORTNESS OF BREATH: Primary | ICD-10-CM

## 2025-05-08 DIAGNOSIS — E03.9 HYPOTHYROIDISM, UNSPECIFIED TYPE: ICD-10-CM

## 2025-05-08 DIAGNOSIS — R60.1 GENERALIZED EDEMA: ICD-10-CM

## 2025-05-08 DIAGNOSIS — R63.5 WEIGHT GAIN: ICD-10-CM

## 2025-05-08 DIAGNOSIS — E55.9 VITAMIN D DEFICIENCY: ICD-10-CM

## 2025-05-08 DIAGNOSIS — N18.32 STAGE 3B CHRONIC KIDNEY DISEASE (HCC): ICD-10-CM

## 2025-05-08 DIAGNOSIS — R79.89 ELEVATED BRAIN NATRIURETIC PEPTIDE (BNP) LEVEL: ICD-10-CM

## 2025-05-08 RX ORDER — ERGOCALCIFEROL 1.25 MG/1
50000 CAPSULE, LIQUID FILLED ORAL WEEKLY
Qty: 12 CAPSULE | Refills: 3 | Status: SHIPPED | OUTPATIENT
Start: 2025-05-08 | End: 2025-06-10 | Stop reason: CLARIF

## 2025-05-08 RX ORDER — LEVOTHYROXINE SODIUM 25 UG/1
25 TABLET ORAL DAILY
Qty: 30 TABLET | Refills: 2 | Status: ON HOLD | OUTPATIENT
Start: 2025-05-08 | End: 2025-05-24 | Stop reason: HOSPADM

## 2025-05-08 RX ORDER — FUROSEMIDE 20 MG/1
20 TABLET ORAL DAILY
Qty: 30 TABLET | Refills: 0 | Status: ON HOLD | OUTPATIENT
Start: 2025-05-08 | End: 2025-05-24 | Stop reason: HOSPADM

## 2025-05-08 NOTE — TELEPHONE ENCOUNTER
Patient called, no answer.  Unidentified voicemail- left message to call back clinic.    Patient has several abnormal labs:  1.  Elevated BNP- patient likely has heart failure (we discussed this possibility at her appointment) that is causing many of her symptoms.  She needs an ECHO and she needs to see cardiology.  I've ordered both, I've also ordered her a daily dose of lasix for her to start until she can see a cardiologist.  We still need to be careful with the lasix due to her kidney function, but it should help with her swelling.  If she has any significant/acute worsening of SOB or her symptoms in general, I would advise her to go to ER for evaluation.   2.  Her thyroid lab is abnormal, which could also be contributing to her swelling/fatigue. I've started her on a low dose thyroid medication and this lab will need to be repeated in a few months.   3.  Her vitamin D is extremely low which will make her very tired.  I've started a once weekly dose that she needs to start taking.      She needs to follow up with Kyra Bustillos in about 2 months for repeat labs and reassessment.  I've ordered repeat labs for her to complete about a week before her appointment.  Let her know we'll call her with an appointment date as soon as we have Kyra's schedule worked out.      Thanks.

## 2025-05-08 NOTE — TELEPHONE ENCOUNTER
[facility] called with an PA for Dapagliflozin is denied by insurance. PA denial and appeal process was faxed to the office.

## 2025-05-12 NOTE — PROGRESS NOTES
Patient Name: Angela Pabon  : 1965  MRN# [id number]    REASON FOR VISIT:  CONSULT  Patient Active Problem List    Diagnosis Date Noted    Stage 3b chronic kidney disease ([facility]) 2022    Secondary hypertension 2022    Persistent proteinuria 2022    Pulmonary hypertension ([facility]) 2022    ILD (interstitial lung disease) ([facility]) 2022    Obesity (BMI 30-39.9) 2022    SOBOE (shortness of breath on exertion) 2022    CKD (chronic kidney disease) stage 4, GFR 15-29 ml/min ([facility]) 2024    Nephrotic syndrome 2023    Type 2 diabetes mellitus with stage 3b chronic kidney disease, without long-term current use of insulin ([facility]) 10/10/2023       LABS:  Lab Results   Component Value Date    CHOL 187 2025    TRIG 81 2025    HDL 63 (H) 2025    LDLDIRECT 86 2023    TSH 5.73 (H) 2025     Hemoglobin A1C   Date Value Ref Range Status   2025 5.0 % Final

## 2025-05-16 ENCOUNTER — APPOINTMENT (OUTPATIENT)
Dept: GENERAL RADIOLOGY | Age: 60
End: 2025-05-16
Payer: COMMERCIAL

## 2025-05-16 ENCOUNTER — HOSPITAL ENCOUNTER (INPATIENT)
Age: 60
LOS: 8 days | Discharge: HOME | End: 2025-05-24
Attending: INTERNAL MEDICINE | Admitting: STUDENT IN AN ORGANIZED HEALTH CARE EDUCATION/TRAINING PROGRAM
Payer: COMMERCIAL

## 2025-05-16 ENCOUNTER — INITIAL CONSULT (OUTPATIENT)
Dept: CARDIOLOGY CLINIC | Age: 60
End: 2025-05-16
Payer: COMMERCIAL

## 2025-05-16 VITALS
HEIGHT: 65 IN | BODY MASS INDEX: 40.48 KG/M2 | DIASTOLIC BLOOD PRESSURE: 68 MMHG | WEIGHT: 243 LBS | HEART RATE: 84 BPM | SYSTOLIC BLOOD PRESSURE: 136 MMHG

## 2025-05-16 DIAGNOSIS — N18.32 TYPE 2 DIABETES MELLITUS WITH STAGE 3B CHRONIC KIDNEY DISEASE, WITHOUT LONG-TERM CURRENT USE OF INSULIN (HCC): ICD-10-CM

## 2025-05-16 DIAGNOSIS — E11.22 TYPE 2 DIABETES MELLITUS WITH STAGE 3B CHRONIC KIDNEY DISEASE, WITHOUT LONG-TERM CURRENT USE OF INSULIN (HCC): ICD-10-CM

## 2025-05-16 DIAGNOSIS — R06.02 SOB (SHORTNESS OF BREATH): Primary | ICD-10-CM

## 2025-05-16 DIAGNOSIS — R79.89 ELEVATED TROPONIN: ICD-10-CM

## 2025-05-16 DIAGNOSIS — N18.4 CKD (CHRONIC KIDNEY DISEASE) STAGE 4, GFR 15-29 ML/MIN (HCC): ICD-10-CM

## 2025-05-16 DIAGNOSIS — E78.5 DYSLIPIDEMIA: ICD-10-CM

## 2025-05-16 DIAGNOSIS — R60.0 BILATERAL LEG EDEMA: ICD-10-CM

## 2025-05-16 DIAGNOSIS — R60.1 ANASARCA: ICD-10-CM

## 2025-05-16 DIAGNOSIS — I27.20 PULMONARY HYPERTENSION (HCC): ICD-10-CM

## 2025-05-16 DIAGNOSIS — N04.9 NEPHROTIC SYNDROME: ICD-10-CM

## 2025-05-16 DIAGNOSIS — E83.42 HYPOMAGNESEMIA: ICD-10-CM

## 2025-05-16 DIAGNOSIS — I10 PRIMARY HYPERTENSION: ICD-10-CM

## 2025-05-16 DIAGNOSIS — I50.9 ACUTE CONGESTIVE HEART FAILURE, UNSPECIFIED HEART FAILURE TYPE (HCC): Primary | ICD-10-CM

## 2025-05-16 DIAGNOSIS — E66.9 OBESITY (BMI 30-39.9): ICD-10-CM

## 2025-05-16 DIAGNOSIS — E87.6 HYPOKALEMIA: ICD-10-CM

## 2025-05-16 DIAGNOSIS — J84.9 ILD (INTERSTITIAL LUNG DISEASE) (HCC): ICD-10-CM

## 2025-05-16 LAB
ALBUMIN SERPL-MCNC: 2.6 G/DL (ref 3.4–5)
ALBUMIN/GLOB SERPL: 0.9 {RATIO} (ref 1.1–2.2)
ALP SERPL-CCNC: 111 U/L (ref 40–129)
ALT SERPL-CCNC: 32 U/L (ref 10–40)
ANION GAP SERPL CALCULATED.3IONS-SCNC: 13 MMOL/L (ref 9–17)
ANION GAP SERPL CALCULATED.3IONS-SCNC: 13 MMOL/L (ref 9–17)
AST SERPL-CCNC: 40 U/L (ref 15–37)
BILIRUB SERPL-MCNC: 0.7 MG/DL (ref 0–1)
BILIRUB UR QL STRIP: NEGATIVE
BNP SERPL-MCNC: ABNORMAL PG/ML (ref 0–125)
BUN SERPL-MCNC: 19 MG/DL (ref 7–20)
BUN SERPL-MCNC: 19 MG/DL (ref 7–20)
CALCIUM SERPL-MCNC: 8 MG/DL (ref 8.3–10.6)
CALCIUM SERPL-MCNC: 8.2 MG/DL (ref 8.3–10.6)
CASTS #/AREA URNS LPF: ABNORMAL /LPF
CHARACTER UR: ABNORMAL
CHLORIDE SERPL-SCNC: 111 MMOL/L (ref 99–110)
CHLORIDE SERPL-SCNC: 112 MMOL/L (ref 99–110)
CLARITY UR: CLEAR
CO2 SERPL-SCNC: 17 MMOL/L (ref 21–32)
CO2 SERPL-SCNC: 18 MMOL/L (ref 21–32)
COLOR UR: YELLOW
CREAT SERPL-MCNC: 2.7 MG/DL (ref 0.6–1.1)
CREAT SERPL-MCNC: 2.9 MG/DL (ref 0.6–1.1)
EKG ATRIAL RATE: 267 BPM
EKG DIAGNOSIS: NORMAL
EKG Q-T INTERVAL: 406 MS
EKG QRS DURATION: 70 MS
EKG QTC CALCULATION (BAZETT): 441 MS
EKG R AXIS: 86 DEGREES
EKG T AXIS: 91 DEGREES
EKG VENTRICULAR RATE: 71 BPM
EPI CELLS #/AREA URNS HPF: 2 /HPF
ERYTHROCYTE [DISTWIDTH] IN BLOOD BY AUTOMATED COUNT: 14.8 % (ref 11.7–14.9)
GFR, ESTIMATED: 17 ML/MIN/1.73M2
GFR, ESTIMATED: 18 ML/MIN/1.73M2
GLUCOSE SERPL-MCNC: 134 MG/DL (ref 74–99)
GLUCOSE SERPL-MCNC: 142 MG/DL (ref 74–99)
GLUCOSE UR STRIP-MCNC: 100 MG/DL
HCT VFR BLD AUTO: 28.6 % (ref 37–47)
HGB BLD-MCNC: 8.7 G/DL (ref 12.5–16)
HGB UR QL STRIP.AUTO: NEGATIVE
KETONES UR STRIP-MCNC: NEGATIVE MG/DL
LEUKOCYTE ESTERASE UR QL STRIP: NEGATIVE
MAGNESIUM SERPL-MCNC: 1.7 MG/DL (ref 1.8–2.4)
MCH RBC QN AUTO: 27.3 PG (ref 27–31)
MCHC RBC AUTO-ENTMCNC: 30.4 G/DL (ref 32–36)
MCV RBC AUTO: 89.7 FL (ref 78–100)
MUCOUS THREADS URNS QL MICRO: ABNORMAL
NITRITE UR QL STRIP: NEGATIVE
PH UR STRIP: 6 [PH] (ref 5–8)
PLATELET # BLD AUTO: 252 K/UL (ref 140–440)
PMV BLD AUTO: 10.7 FL (ref 7.5–11.1)
POTASSIUM SERPL-SCNC: 2.9 MMOL/L (ref 3.5–5.1)
POTASSIUM SERPL-SCNC: 3.5 MMOL/L (ref 3.5–5.1)
PROT SERPL-MCNC: 5.3 G/DL (ref 6.4–8.2)
PROT UR STRIP-MCNC: >=300 MG/DL
RBC # BLD AUTO: 3.19 M/UL (ref 4.2–5.4)
RBC #/AREA URNS HPF: 3 /HPF (ref 0–2)
SODIUM SERPL-SCNC: 141 MMOL/L (ref 136–145)
SODIUM SERPL-SCNC: 143 MMOL/L (ref 136–145)
SP GR UR STRIP: 1.02 (ref 1–1.03)
TROPONIN I SERPL HS-MCNC: 166 NG/L (ref 0–14)
TROPONIN I SERPL HS-MCNC: 187 NG/L (ref 0–14)
UROBILINOGEN UR STRIP-ACNC: 0.2 EU/DL (ref 0–1)
WBC #/AREA URNS HPF: 4 /HPF (ref 0–5)
WBC OTHER # BLD: 4.8 K/UL (ref 4–10.5)

## 2025-05-16 PROCEDURE — 83735 ASSAY OF MAGNESIUM: CPT

## 2025-05-16 PROCEDURE — 3044F HG A1C LEVEL LT 7.0%: CPT | Performed by: INTERNAL MEDICINE

## 2025-05-16 PROCEDURE — 93000 ELECTROCARDIOGRAM COMPLETE: CPT | Performed by: INTERNAL MEDICINE

## 2025-05-16 PROCEDURE — 6360000002 HC RX W HCPCS

## 2025-05-16 PROCEDURE — 94761 N-INVAS EAR/PLS OXIMETRY MLT: CPT

## 2025-05-16 PROCEDURE — 99285 EMERGENCY DEPT VISIT HI MDM: CPT

## 2025-05-16 PROCEDURE — 93010 ELECTROCARDIOGRAM REPORT: CPT | Performed by: INTERNAL MEDICINE

## 2025-05-16 PROCEDURE — 83880 ASSAY OF NATRIURETIC PEPTIDE: CPT

## 2025-05-16 PROCEDURE — 2500000003 HC RX 250 WO HCPCS: Performed by: INTERNAL MEDICINE

## 2025-05-16 PROCEDURE — 1200000000 HC SEMI PRIVATE

## 2025-05-16 PROCEDURE — 81001 URINALYSIS AUTO W/SCOPE: CPT

## 2025-05-16 PROCEDURE — 71045 X-RAY EXAM CHEST 1 VIEW: CPT

## 2025-05-16 PROCEDURE — 3075F SYST BP GE 130 - 139MM HG: CPT | Performed by: INTERNAL MEDICINE

## 2025-05-16 PROCEDURE — 76937 US GUIDE VASCULAR ACCESS: CPT

## 2025-05-16 PROCEDURE — 05HA33Z INSERTION OF INFUSION DEVICE INTO LEFT BRACHIAL VEIN, PERCUTANEOUS APPROACH: ICD-10-PCS | Performed by: STUDENT IN AN ORGANIZED HEALTH CARE EDUCATION/TRAINING PROGRAM

## 2025-05-16 PROCEDURE — C1751 CATH, INF, PER/CENT/MIDLINE: HCPCS

## 2025-05-16 PROCEDURE — 2709999900 HC NON-CHARGEABLE SUPPLY

## 2025-05-16 PROCEDURE — 85027 COMPLETE CBC AUTOMATED: CPT

## 2025-05-16 PROCEDURE — 36410 VNPNXR 3YR/> PHY/QHP DX/THER: CPT

## 2025-05-16 PROCEDURE — 84484 ASSAY OF TROPONIN QUANT: CPT

## 2025-05-16 PROCEDURE — 6360000002 HC RX W HCPCS: Performed by: INTERNAL MEDICINE

## 2025-05-16 PROCEDURE — 36415 COLL VENOUS BLD VENIPUNCTURE: CPT

## 2025-05-16 PROCEDURE — 6370000000 HC RX 637 (ALT 250 FOR IP)

## 2025-05-16 PROCEDURE — 99204 OFFICE O/P NEW MOD 45 MIN: CPT | Performed by: INTERNAL MEDICINE

## 2025-05-16 PROCEDURE — 6370000000 HC RX 637 (ALT 250 FOR IP): Performed by: INTERNAL MEDICINE

## 2025-05-16 PROCEDURE — 3078F DIAST BP <80 MM HG: CPT | Performed by: INTERNAL MEDICINE

## 2025-05-16 PROCEDURE — 80053 COMPREHEN METABOLIC PANEL: CPT

## 2025-05-16 PROCEDURE — 93005 ELECTROCARDIOGRAM TRACING: CPT | Performed by: INTERNAL MEDICINE

## 2025-05-16 PROCEDURE — 2580000003 HC RX 258: Performed by: INTERNAL MEDICINE

## 2025-05-16 PROCEDURE — 80048 BASIC METABOLIC PNL TOTAL CA: CPT

## 2025-05-16 RX ORDER — SODIUM CHLORIDE 0.9 % (FLUSH) 0.9 %
5-40 SYRINGE (ML) INJECTION PRN
Status: DISCONTINUED | OUTPATIENT
Start: 2025-05-16 | End: 2025-05-24 | Stop reason: HOSPADM

## 2025-05-16 RX ORDER — ACETAMINOPHEN 650 MG/1
650 SUPPOSITORY RECTAL EVERY 6 HOURS PRN
Status: DISCONTINUED | OUTPATIENT
Start: 2025-05-16 | End: 2025-05-24 | Stop reason: HOSPADM

## 2025-05-16 RX ORDER — CLONIDINE HYDROCHLORIDE 0.1 MG/1
0.1 TABLET ORAL DAILY
Status: DISCONTINUED | OUTPATIENT
Start: 2025-05-17 | End: 2025-05-24 | Stop reason: HOSPADM

## 2025-05-16 RX ORDER — ONDANSETRON 4 MG/1
4 TABLET, ORALLY DISINTEGRATING ORAL EVERY 8 HOURS PRN
Status: DISCONTINUED | OUTPATIENT
Start: 2025-05-16 | End: 2025-05-24 | Stop reason: HOSPADM

## 2025-05-16 RX ORDER — POLYETHYLENE GLYCOL 3350 17 G/17G
17 POWDER, FOR SOLUTION ORAL DAILY PRN
Status: DISCONTINUED | OUTPATIENT
Start: 2025-05-16 | End: 2025-05-24 | Stop reason: HOSPADM

## 2025-05-16 RX ORDER — ATORVASTATIN CALCIUM 40 MG/1
80 TABLET, FILM COATED ORAL NIGHTLY
Status: DISCONTINUED | OUTPATIENT
Start: 2025-05-17 | End: 2025-05-24 | Stop reason: HOSPADM

## 2025-05-16 RX ORDER — ACETAMINOPHEN 325 MG/1
650 TABLET ORAL EVERY 6 HOURS PRN
Status: DISCONTINUED | OUTPATIENT
Start: 2025-05-16 | End: 2025-05-24 | Stop reason: HOSPADM

## 2025-05-16 RX ORDER — ONDANSETRON 2 MG/ML
4 INJECTION INTRAMUSCULAR; INTRAVENOUS EVERY 6 HOURS PRN
Status: DISCONTINUED | OUTPATIENT
Start: 2025-05-16 | End: 2025-05-24 | Stop reason: HOSPADM

## 2025-05-16 RX ORDER — MAGNESIUM SULFATE IN WATER 40 MG/ML
2000 INJECTION, SOLUTION INTRAVENOUS ONCE
Status: COMPLETED | OUTPATIENT
Start: 2025-05-16 | End: 2025-05-16

## 2025-05-16 RX ORDER — SODIUM CHLORIDE 9 MG/ML
INJECTION, SOLUTION INTRAVENOUS PRN
Status: DISCONTINUED | OUTPATIENT
Start: 2025-05-16 | End: 2025-05-24 | Stop reason: HOSPADM

## 2025-05-16 RX ORDER — HYDROCHLOROTHIAZIDE 12.5 MG/1
12.5 TABLET ORAL DAILY
Status: DISCONTINUED | OUTPATIENT
Start: 2025-05-17 | End: 2025-05-18

## 2025-05-16 RX ORDER — HYDRALAZINE HYDROCHLORIDE 50 MG/1
100 TABLET, FILM COATED ORAL 2 TIMES DAILY
Status: DISCONTINUED | OUTPATIENT
Start: 2025-05-16 | End: 2025-05-24 | Stop reason: HOSPADM

## 2025-05-16 RX ORDER — SODIUM CHLORIDE 0.9 % (FLUSH) 0.9 %
5-40 SYRINGE (ML) INJECTION EVERY 12 HOURS SCHEDULED
Status: DISCONTINUED | OUTPATIENT
Start: 2025-05-16 | End: 2025-05-24 | Stop reason: HOSPADM

## 2025-05-16 RX ORDER — POTASSIUM CHLORIDE 7.45 MG/ML
10 INJECTION INTRAVENOUS ONCE
Status: DISCONTINUED | OUTPATIENT
Start: 2025-05-16 | End: 2025-05-22

## 2025-05-16 RX ORDER — HYDRALAZINE HYDROCHLORIDE 20 MG/ML
10 INJECTION INTRAMUSCULAR; INTRAVENOUS EVERY 4 HOURS PRN
Status: DISCONTINUED | OUTPATIENT
Start: 2025-05-16 | End: 2025-05-24 | Stop reason: HOSPADM

## 2025-05-16 RX ORDER — LABETALOL HYDROCHLORIDE 5 MG/ML
10 INJECTION, SOLUTION INTRAVENOUS EVERY 4 HOURS PRN
Status: DISCONTINUED | OUTPATIENT
Start: 2025-05-16 | End: 2025-05-24 | Stop reason: HOSPADM

## 2025-05-16 RX ORDER — FUROSEMIDE 10 MG/ML
40 INJECTION INTRAMUSCULAR; INTRAVENOUS ONCE
Status: COMPLETED | OUTPATIENT
Start: 2025-05-16 | End: 2025-05-16

## 2025-05-16 RX ORDER — FUROSEMIDE 10 MG/ML
40 INJECTION INTRAMUSCULAR; INTRAVENOUS 2 TIMES DAILY
Status: CANCELLED | OUTPATIENT
Start: 2025-05-16

## 2025-05-16 RX ORDER — LEVOTHYROXINE SODIUM 25 UG/1
25 TABLET ORAL
Status: DISCONTINUED | OUTPATIENT
Start: 2025-05-17 | End: 2025-05-22

## 2025-05-16 RX ORDER — CALCIUM GLUCONATE 20 MG/ML
2000 INJECTION, SOLUTION INTRAVENOUS ONCE
Status: COMPLETED | OUTPATIENT
Start: 2025-05-16 | End: 2025-05-16

## 2025-05-16 RX ORDER — ENOXAPARIN SODIUM 100 MG/ML
30 INJECTION SUBCUTANEOUS DAILY
Status: DISCONTINUED | OUTPATIENT
Start: 2025-05-17 | End: 2025-05-24 | Stop reason: HOSPADM

## 2025-05-16 RX ORDER — POTASSIUM CHLORIDE 1500 MG/1
40 TABLET, EXTENDED RELEASE ORAL ONCE
Status: COMPLETED | OUTPATIENT
Start: 2025-05-16 | End: 2025-05-16

## 2025-05-16 RX ORDER — POTASSIUM CHLORIDE 7.45 MG/ML
10 INJECTION INTRAVENOUS ONCE
Status: COMPLETED | OUTPATIENT
Start: 2025-05-16 | End: 2025-05-16

## 2025-05-16 RX ADMIN — CALCIUM GLUCONATE 2000 MG: 20 INJECTION, SOLUTION INTRAVENOUS at 17:52

## 2025-05-16 RX ADMIN — ACETAMINOPHEN 650 MG: 325 TABLET ORAL at 21:14

## 2025-05-16 RX ADMIN — POTASSIUM BICARBONATE 20 MEQ: 782 TABLET, EFFERVESCENT ORAL at 16:58

## 2025-05-16 RX ADMIN — SODIUM CHLORIDE, PRESERVATIVE FREE 10 ML: 5 INJECTION INTRAVENOUS at 21:14

## 2025-05-16 RX ADMIN — FUROSEMIDE 5 MG/HR: 10 INJECTION, SOLUTION INTRAMUSCULAR; INTRAVENOUS at 17:54

## 2025-05-16 RX ADMIN — HYDRALAZINE HYDROCHLORIDE 100 MG: 50 TABLET ORAL at 21:14

## 2025-05-16 RX ADMIN — MAGNESIUM SULFATE HEPTAHYDRATE 2000 MG: 40 INJECTION, SOLUTION INTRAVENOUS at 15:50

## 2025-05-16 RX ADMIN — LABETALOL HYDROCHLORIDE 10 MG: 5 INJECTION, SOLUTION INTRAVENOUS at 16:48

## 2025-05-16 RX ADMIN — POTASSIUM CHLORIDE 40 MEQ: 1500 TABLET, EXTENDED RELEASE ORAL at 15:52

## 2025-05-16 RX ADMIN — FUROSEMIDE 40 MG: 10 INJECTION, SOLUTION INTRAMUSCULAR; INTRAVENOUS at 15:47

## 2025-05-16 RX ADMIN — POTASSIUM CHLORIDE 10 MEQ: 7.46 INJECTION, SOLUTION INTRAVENOUS at 15:52

## 2025-05-16 RX ADMIN — HYDRALAZINE HYDROCHLORIDE 10 MG: 20 INJECTION INTRAMUSCULAR; INTRAVENOUS at 18:03

## 2025-05-16 ASSESSMENT — LIFESTYLE VARIABLES
HOW MANY STANDARD DRINKS CONTAINING ALCOHOL DO YOU HAVE ON A TYPICAL DAY: PATIENT DOES NOT DRINK
HOW OFTEN DO YOU HAVE A DRINK CONTAINING ALCOHOL: NEVER

## 2025-05-16 ASSESSMENT — PAIN SCALES - GENERAL
PAINLEVEL_OUTOF10: 2
PAINLEVEL_OUTOF10: 3

## 2025-05-16 ASSESSMENT — PAIN - FUNCTIONAL ASSESSMENT: PAIN_FUNCTIONAL_ASSESSMENT: 0-10

## 2025-05-16 NOTE — ED NOTES
Attempted to call PICC team twice to see when they will be available to start an IV on the patient. Unable to get a hold of them.

## 2025-05-16 NOTE — PATIENT INSTRUCTIONS
SOB: Etiology needs to be worked up.  Although she does carry no history of chronic renal failure, interstitial lung disease but currently she has anasarca and she is getting out of breath just walking 2 steps.  She was not even able to get up on the examination table for me to examine her properly.  Her serum proBNP level is significantly elevated and her EKG shows low voltage complexes I cannot exclude the possibility of any pericardial effusion.  Other than EKG we do not have any cardiac evaluation performed on her I do not even have a chest x-ray at this time.  Therefore I suggest patient to be taken to the hospital by ambulance and workup completed.  I talked to Dr. Smith at emergency room at [facility] and I will be transferring the patient by ambulance to the ED.      I spent about 30 min. of time in review of the available data, chart Prep., interviewing patient, obtaining history, performing physical exam, going through decision making analysis for assessment & plans of management on this patient.    Office Visit once discharged from the hospital.

## 2025-05-16 NOTE — PROGRESS NOTES
REPLETE POTASSIUM  START LASIX 40MG IV BID SINCE SHE IS HYPERVOLEMIC  MONITOR K AND MAG    FULL NOTE TO FOLLOW    THANK YOU

## 2025-05-16 NOTE — ED NOTES
Attempted to call PICC team again, unable to answer at this time. Unable to start medication due to not having an IV.

## 2025-05-16 NOTE — PROGRESS NOTES
Error  
CLINICAL STAFF DOCUMENTATION    Dr. Damir Pabon  1965  [id number]    Have you had any Chest Pain recently? - No        Have you had any Shortness of Breath - Yes  When did it begin? - Months   If Yes - When on exertion  How many flights of stairs can you go up without having SOB? -   Are you on Oxygen during the day or at night? - No  How many liters of Oxygen are you on? -   How many pillows do you sleep with under your head? - 2 with elevated bed     Have you had any dizziness - No    Have you had any palpitations recently? - No    Do you have any edema - swelling in legs    If Yes - CHECK TO SEE IF THE EDEMA IS PITTING  How long have they been having edema - Months  If Yes - Have they worn compression stockings No  If they have worn compression stockings        Is the patient on any of the following medications -   If Yes DO EKG - Needs done every 3 months    When did you have your last labs drawn 5/25  What doctor ordered isaac   Do we have the labs in their chart Yes  If we do not have the labs, ask where they were drawn     If we do not have these labs, you are retrieve these labs for the provider!    Do you need any prescriptions refilled? -     Do you have a surgery or procedure scheduled in the near future - No      Do use tobacco products? - No  Do you drink alcohol? - No  Do you use any illicit drugs? - No  Caffeine? - Yes  How much caffeine? .1  bottles soda       Check medication list thoroughly!!! AND RECONCILE OUTSIDE MEDICATIONS  If dose has changed change the entire order not just the MG  BE SURE TO ASK PATIENT IF THEY NEED MEDICATION REFILLS  Verify Pharmacy and update if incorrect    Add to every patient's \"wrap up\" the following dot phrase AFTERVISITCARDIOHEARTHOUSE and ensure we explain this to our patients   
lymph nodes    Physical Examination:    /68 (BP Site: Left Upper Arm, Patient Position: Sitting, BP Cuff Size: Medium Adult)   Pulse 84   Ht 1.651 m (5' 5\")   Wt 110.2 kg (243 lb)   BMI 40.44 kg/m²    Wt Readings from Last 3 Encounters:   05/16/25 110.2 kg (243 lb)   05/06/25 111.1 kg (245 lb)   07/23/24 86.6 kg (191 lb)     Body mass index is 40.44 kg/m².    General Appearance:  Morbidly-obese/Well Nourished  1. Skin: It is warm & dry. No rashes noted.  Patient has anasarca.  2. Eyes: No conjunctival Pallor seen. No jaundice noted.  3. Neck: is supple there is  elevation of JVD. No thyromegaly  4. Respiratory:  Resp Assessment: Diminished breath sounds  Resp Auscultation: Vesicular breath sounds without rales or wheezing.  5. Cardiovascular:  Auscultation: Normal S1 & S2, No prominent murmurs  Carotid Arteries: No bruits present  Abdominal Aorta: Non-palpable  Pedal Pulses: 2+ and equal   6. Abdomen:   obese cannot palpate for masses at this time   7. Musculoskeletal: No joint deformities. No muscle wasting  8. Extremities:   No Cyanosis or Clubbing.  There is significant edema. Peripheral pulses are NOT Palpable.  9. Rectal / genital:   Deferred  10. Neurological/Psychiatric:  Oriented to time, place, and person  Non-anxious    No results found for: \"CKTOTAL\", \"CKMB\", \"CKMBINDEX\", \"TROPONINT\"  BNP:    Lab Results   Component Value Date/Time    PROBNP 19,545 05/06/2025 10:09 AM     PT/INR:  No results found for: \"PTINR\"  A1C:  Lab Results   Component Value Date    LABA1C 5.0 05/06/2025    LABA1C 6.2 (H) 09/25/2023     Lipids:   Lab Results   Component Value Date    CHOL 187 05/06/2025    CHOL 282 (H) 07/21/2022     Lab Results   Component Value Date    TRIG 81 05/06/2025    TRIG 108 07/21/2022     Lab Results   Component Value Date    HDL 63 (H) 05/06/2025    HDL 73 07/21/2022     Lab Results   Component Value Date     (H) 05/06/2025     (H) 07/21/2022     Lab Results   Component Value Date

## 2025-05-16 NOTE — ED PROVIDER NOTES
[facility] EMERGENCY DEPARTMENT  EMERGENCY DEPARTMENT ENCOUNTER        Pt Name: Angela Pabon  MRN: [id number]  Birthdate 1965  Date of evaluation: 5/16/2025  Provider: REHANA Curran - CNP  PCP: Willa Ravi MD  Note Started: 10:42 AM EDT 5/16/25      TI. I have evaluated this patient.        CHIEF COMPLAINT       Chief Complaint   Patient presents with    Shortness of Breath       HISTORY OF PRESENT ILLNESS: 1 or more Elements     History From: Patient    Limitations to history : None    Social Determinants Significantly Affecting Health : None    Chief Complaint: Edema shortness of breath    Angela Pabon is a 60 y.o. female history of acute respiratory failure, HTN, type 2 diabetes, CKD stage IV, HLD, interstitial lung disease who presents to ED stating she has been out of her HCTZ since February and ran out of her Lasix in March.  Stated she did have history of swelling but never this bad.  States over the last few days she has had significantly increased edema.  States it is all the way from her legs into her stomach and her arms.  Denies any current chest pain.  Stated she is significantly short of breath even at rest.  Significantly fatigued and short of breath with activity.  Denies any recent fevers illnesses or infections.  Denies any recent fevers cough congestion.  Denies abdominal pain nausea vomiting diarrhea.  Denies any dysuria or hematuria.    Nursing Notes were all reviewed and agreed with or any disagreements were addressed in the HPI.    REVIEW OF SYSTEMS :      Review of Systems    Positives and Pertinent negatives as per HPI.     SURGICAL HISTORY     Past Surgical History:   Procedure Laterality Date    BREAST BIOPSY      COLONOSCOPY      WISDOM TOOTH EXTRACTION         CURRENTMEDICATIONS       Previous Medications    ATORVASTATIN (LIPITOR) 80 MG TABLET    Take 1 tablet by mouth daily    CLONIDINE (CATAPRES) 0.1 MG TABLET    Take 1 tablet by mouth  provided 35 minutes of non-concurrent critical care out of the total shared critical care time provided, excluding separately reportable procedures.        See interventions in ED course.     EMERGENCY DEPARTMENT COURSE and DIFFERENTIAL DIAGNOSIS/MDM:   Vitals:    Vitals:    05/16/25 1202 05/16/25 1214 05/16/25 1232 05/16/25 1309   BP: (!) 168/77  (!) 171/86 (!) 189/84   Pulse: 68 69 70 78   Resp: 23 19 19 22   Temp:       SpO2: 97% 97% 98% 99%   Weight:       Height:           Patient was given the following medications:  Medications   potassium chloride 10 mEq/100 mL IVPB (Peripheral Line) (has no administration in time range)   potassium chloride 10 mEq/100 mL IVPB (Peripheral Line) (has no administration in time range)   potassium chloride (KLOR-CON M) extended release tablet 40 mEq (has no administration in time range)   magnesium sulfate 2000 mg in 50 mL IVPB premix (has no administration in time range)   furosemide (LASIX) injection 40 mg (has no administration in time range)       Chronic Conditions affecting care:    has a past medical history of Acute hypoxemic respiratory failure (HCC), Essential hypertension, Human metapneumovirus (hMPV) pneumonia, Hypertension, Obesity, Sepsis (HCC), and Type 2 diabetes mellitus without complication (HCC).    CONSULTS: (Who and What was discussed)  IP CONSULT TO NEPHROLOGY  IP CONSULT TO VASCULAR ACCESS TEAM  Consult to nephrology patient CKD stage IV CHF hypochloremic recommendations regarding diuresis.  Orders for 40 mg Lasix replacement with replacing potassium.  Consult inpatient hospitalist team for admission to hospital    Records Reviewed (External, Source and Summary) PCP Dr. Ravi  Type 2 diabetes stage IV CKD starting Farxiga HTN HLD generalized edema uses Lasix intermittently visit 5/6/2025  CC/HPI Summary, DDx, ED Course, and Reassessment: See HPI and physical above    Patient presenting to ED from cardiology office visit for concerns of CHF.  On  Hypokalemia    3. Hypomagnesemia    4. Elevated troponin    5. CKD (chronic kidney disease) stage 4, GFR 15-29 ml/min ([facility])          DISPOSITION/PLAN     DISPOSITION Decision To Admit 05/16/2025 01:30:47 PM   DISPOSITION CONDITION Stable           PATIENT REFERRED TO:  No follow-up provider specified.    DISCHARGE MEDICATIONS:  New Prescriptions    No medications on file       DISCONTINUED MEDICATIONS:  Discontinued Medications    No medications on file              (Please note that portions of this note were completed with a voice recognition program.  Efforts were made to edit the dictations but occasionally words are mis-transcribed.)    REHANA Curran CNP (electronically signed)        Lynne Ontiveros APRN - CNP  05/16/25 9621

## 2025-05-16 NOTE — ED NOTES
ED TO INPATIENT SBAR HANDOFF    Patient Name: Angela Pabon   :  1965  60 y.o.   Preferred Name  Angela Pabon  Family/Caregiver Present no   Restraints no   C-SSRS: Risk of Suicide: No Risk  Sitter no   Sepsis Risk Score Sepsis V2 Risk Score: 18.9      Situation  Chief Complaint   Patient presents with    Shortness of Breath     Brief Description of Patient's Condition: Angela Pabon is a 60 y.o. female history of acute respiratory failure, HTN, type 2 diabetes, CKD stage IV, HLD, interstitial lung disease who presents to ED stating she has been out of her HCTZ since February and ran out of her Lasix in March.  Stated she did have history of swelling but never this bad.  States over the last few days she has had significantly increased edema.  States it is all the way from her legs into her stomach and her arms.  Denies any current chest pain.  Stated she is significantly short of breath even at rest.  Significantly fatigued and short of breath with activity.  Denies any recent fevers illnesses or infections.  Denies any recent fevers cough congestion.  Denies abdominal pain nausea vomiting diarrhea.  Denies any dysuria or hematuria.   Mental Status: oriented, alert, and thought processes intact  Arrived from: home    Imaging:   XR CHEST PORTABLE   Final Result        Abnormal labs:   Abnormal Labs Reviewed   CBC - Abnormal; Notable for the following components:       Result Value    RBC 3.19 (*)     Hemoglobin 8.7 (*)     Hematocrit 28.6 (*)     MCHC 30.4 (*)     All other components within normal limits   COMPREHENSIVE METABOLIC PANEL - Abnormal; Notable for the following components:    Potassium 2.9 (*)     Chloride 111 (*)     CO2 18 (*)     Glucose 134 (*)     Creatinine 2.9 (*)     Est, Glom Filt Rate 17 (*)     Calcium 8.0 (*)     Total Protein 5.3 (*)     Albumin 2.6 (*)     Albumin/Globulin Ratio 0.9 (*)     AST 40 (*)     All other components within normal limits   TROPONIN - Abnormal; Notable  for the following components:    Troponin, High Sensitivity 187 (*)     All other components within normal limits   TROPONIN - Abnormal; Notable for the following components:    Troponin, High Sensitivity 166 (*)     All other components within normal limits   BRAIN NATRIURETIC PEPTIDE - Abnormal; Notable for the following components:    NT Pro-BNP 20,147 (*)     All other components within normal limits   MAGNESIUM - Abnormal; Notable for the following components:    Magnesium 1.7 (*)     All other components within normal limits        Background  History:   Past Medical History:   Diagnosis Date    Acute hypoxemic respiratory failure (HCC)     Essential hypertension     Human metapneumovirus (hMPV) pneumonia     Hypertension     Obesity     Sepsis (HCC)     Type 2 diabetes mellitus without complication (HCC)        Assessment    Vitals:    Level of Consciousness: Alert (0)   Vitals:    05/16/25 1131 05/16/25 1202 05/16/25 1214 05/16/25 1232   BP: (!) 163/78 (!) 168/77  (!) 171/86   Pulse: 69 68 69 70   Resp: 19 23 19 19   Temp:       SpO2: 98% 97% 97% 98%   Weight:       Height:           PO Status: Regular    O2 Flow Rate:        Cardiac Rhythm:     Last documented pain medication administered:     NIH Score: NIH       Active LDA's:      Pertinent or High Risk Medications/Drips: no   If Yes, please provide details:       Blood Product Administration: no  If Yes, please provide details:     Recommendation    Incomplete orders   Additional Comments:   If any further questions, please call Sending RN at [phone]    Electronically signed by: Electronically signed by Sandy Domingo RN on 5/16/2025 at 1:31 PM

## 2025-05-16 NOTE — CONSULTS
Midline meets therapeutic needs at this time:  Very limited access d/t gross edema.  Arrow MST Midline inserted in VAZQUEZ Brachia Vessel x 1 attempts using sterile ultrasound guided technique.  Brisk blood return, flushes without resistance.   Difficult hemostasis, SecureportIV applied and held pressure 15 minutes.

## 2025-05-16 NOTE — H&P
V2.0  History and Physical      Name:  Angela Pabon /Age/Sex: 1965  (60 y.o. female)   MRN & CSN:  [id number] & [id number] Encounter Date/Time: 2025 4:08 PM EDT   Location:  [facility]04/[facility]-4 PCP: Willa Ravi MD       Hospital Day: 1    Assessment and Plan:   Angela Pabon is a 60 y.o. female with a pmh of CKD, hypertension, who presents with [facility] Problems           Last Modified POA    * (Principal) Anasaa 2025 Yes       Anasarca  Dyspnea on exertion  CKD stage IV  Hypokalemia  - Presenting complaint-shortness of breath, dyspnea on exertion, diffuse swelling  - Initially seen at cardiology clinic today for same complaint.  proBNP level 19,545.  Weight gain around 50 pounds unclear duration.  - Likely combination of CKD and CHF and her being off of her diuretics  - Started patient on IV Lasix 40 Mg.  Given the severity of patient's anasarca, will initiate patient on Lasix drip 5 mg/hr.  Escalate dose if needed based on response.  - Check BMP twice daily while on Lasix drip  - Aggressively replace potassium  - Obtain echo  - Consult cardiology if needed   - Nephrology team is on board      Essential hypertension, accelerated hypertension, continue clonidine 0.1 Mg daily, hydrochlorothiazide 12.5 Mg daily, hydralazine 100 Mg twice daily, as needed IV hydralazine and labetalol      Disposition:     Diet No diet orders on file   DVT Prophylaxis [] Lovenox, []  Heparin, [] SCDs, [] Ambulation,  [] Eliquis, [] Xarelto  [] Coumadin   Peptic ulcer prophylaxis -   Code Status No Order   Disposition From / Current living situation : home  Expected Disposition: home  Estimated Date of Discharge: 4-5 days  Patient requires continued admission due to anasarca   Surrogate Decision Maker/ POA -     Goals status was discussed in detail with patient.  Verbalized understanding of different options of CODE STATUS.  Patient opted for full code.    History from:     patient    History of  Present Illness:     Chief Complaint: Liz Pabon is a 60 y.o. female with pmh of CKD, hypertension, who presents with shortness of breath, dyspnea on exertion, weight gain, diffuse swelling.  Patient has history of CKD on hydrochlorothiazide and Lasix.  Reportedly ran out of Lasix.  Started to swell up.  Did not come to the hospital due to not having insurance.  Patient reports that she has likely gained about 50 pounds from her baseline weight.  Patient was initially seen in the cardiology clinic who then transferred patient to the emergency for further evaluation and management.        Personally reviewed Lab Studies and Imaging     Discussed management of the case with ED team who recommended admission for further evaluation management    EKG interpreted personally and results normal sinus rhythm with    Imaging that was interpreted personally includes chest x-ray and results bibasilar groundglass opacity likely in the setting of pulmonary edema         Review of Systems: Need 10 Elements   Review of Systems    Negative unless mentioned above    Objective:   No intake or output data in the 24 hours ending 05/16/25 1608   Vitals:   Vitals:    05/16/25 1345 05/16/25 1402 05/16/25 1431 05/16/25 1547   BP: (!) 174/79 (!) 187/85 (!) 185/85 (!) 183/84   Pulse: 68 67 69 65   Resp: 14 15 14 13   Temp:       SpO2: 96% 95% 96% 98%   Weight:       Height:           Medications Prior to Admission     Prior to Admission medications    Medication Sig Start Date End Date Taking? Authorizing Provider   vitamin D (ERGOCALCIFEROL) 1.25 MG (30280 UT) CAPS capsule Take 1 capsule by mouth once a week 5/8/25   Willa Ravi MD   furosemide (LASIX) 20 MG tablet Take 1 tablet by mouth daily 5/8/25   Willa Ravi MD   levothyroxine (SYNTHROID) 25 MCG tablet Take 1 tablet by mouth daily 5/8/25   Willa Ravi MD   atorvastatin (LIPITOR) 80 MG tablet Take 1 tablet by mouth daily 5/6/25   Breonna  contact the dictating provider for clarification.

## 2025-05-16 NOTE — PROGRESS NOTES
" ICU PROGRESS NOTE     NAME: Anne Coleman  DATE: 2021 MRN: [id number]     Gestational Age: 40w1d male born on 2020  Now 2 days and CGA: 40w 3d on HD: 2      CHIEF COMPLAINT (PRIMARY REASON FOR CONTINUED HOSPITALIZATION)     Observation for possible infection     OVERVIEW     Term infant with suspected infection secondary to maternal chorioamnionitis      SIGNIFICANT EVENTS / 24 HOURS      Discussed with bedside nurse patient's course overnight. Nursing notes reviewed.  No significant changes reported     MEDICATIONS:     Scheduled Meds:    Continuous Infusions:      PRN Meds: •  hepatitis B vaccine (recombinant)  •  sodium chloride  •  sucrose  •  zinc oxide     INVASIVE LINES:      UVC (-)    Necessity of devices was discussed with the treatment team and continued or discontinued as appropriate: yes    RESPIRATORY SUPPORT:     none  room air     VITAL SIGNS & PHYSICAL EXAMINATION:     Weight :Weight: 3295 g (7 lb 4.2 oz) Weight change: -30 g (-1.1 oz)  Change from birthweight: -3%    Last HC: Head Circumference: 13.39\" (34 cm)       PainScore:      Temp:  [98.1 °F (36.7 °C)-98.8 °F (37.1 °C)] 98.8 °F (37.1 °C)  Heart Rate:  [120-148] 148  Resp:  [36-48] 36  BP: (53-83)/(32-55) 78/55  SpO2 Current: SpO2: 100 % SpO2  Min: 98 %  Max: 100 %     NORMAL EXAMINATION  UNLESS OTHERWISE NOTED EXCEPTIONS  (AS NOTED)   General/Neuro   In no apparent distress, appears c/w EGA  Exam/reflexes appropriate for age and gestation    Skin   Clear w/o abnomal rash or lesions    HEENT   Normocephalic w/ nl sutures, soft and flat fontanel  Eye exam: red reflex deferred  ENT patent w/o obvious defects    Chest and Lung In no apparent respiratory distress, CTA    Cardiovascular RRR w/o Murmur, normal perfusion and peripheral pulses    Abdomen/Genitalia   Soft, nondistended w/o organomegaly  Normal appearance for gender and gestation    Trunk/Spine/Extremities   Straight w/o obvious defects  Active, mobile without " 4 Eyes Skin Assessment     NAME:  Angela Pabon  YOB: 1965  MEDICAL RECORD NUMBER:  [id number]    The patient is being assessed for  Admission    I agree that at least one RN has performed a thorough Head to Toe Skin Assessment on the patient. ALL assessment sites listed below have been assessed.      Areas assessed by both nurses:    Head, Face, Ears, Shoulders, Back, Chest, Arms, Elbows, Hands, Sacrum. Buttock, Coccyx, Ischium, and Legs. Feet and Heels        Does the Patient have a Wound? No noted wound(s)       Sourav Prevention initiated by RN: No  Wound Care Orders initiated by RN: No    Pressure Injury (Stage 3,4, Unstageable, DTI, NWPT, and Complex wounds) if present, place Wound referral order by RN under : No    New Ostomies, if present place, Ostomy referral order under : No     Nurse 1 eSignature: Electronically signed by Mayra Quiroga RN on 5/16/25 at 5:57 PM EDT    **SHARE this note so that the co-signing nurse can place an eSignature**    Nurse 2 eSignature: {Esignature:[id number]}    "deformity         ACTIVE PROBLEMS:     I have reviewed all the vital signs, input/output, labs and imaging for the past 24 hours within the EMR.    Pertinent findings were reviewed and/or updated in active problem list.     Patient Active Problem List    Diagnosis Date Noted   • Term  delivered by  section, current hospitalization 2021     Note Last Updated: 2021     Assessment: Baby boy \"Roberto\" is a 40w1d born via  for failure to progress following a cervadil/pitocin induction, noted to be having intermittent decels during labor. Mom is 25 G2 now P1. Mother's labs negative, GBS negative. Pregnancy complicated by abnormal 1hr GTT with normal 3hr, h/o COVID during this pregnancy. Maternal medications of note, included PNV and Fioricet in early pregnancy during pregnancy and/or labor. Received cefazolin at delivery. ROM x 16h 21m, AROM with Meconium stained fluids. MBT A+ Ab neg. BW 3395g (AGA per WHO). Apgars 8, 9.     Plan:  - Routine care and screening  - Follow bili on AM labs     •  suspected to be affected by chorioamnionitis 2021   • Need for observation and evaluation of  for sepsis 2021     Note Last Updated: 2021     Assessment: Maternal risk factors for infection included maternal fever 103.1 rectally. Meconium stained fluids. Fetal tachycardia noted during labor (180s). Infant's initial temp 102.8 axillary. Maternal GBS neg. Maternal Abx during labor: No.. Peak maternal temperature 103.1, ROMx 16h 21m  prior to delivery.  Septic work-up done per EOS calculator. Blood Cx (): Admit CBC (17.8/52.2): WBC 13 & bands 0%, platelets 314.  EOS calculator:  • Risk of sepsis at birth: 8.37  • Based on clinical status of well appearing: Risk of sepsis 3.45     Rx: Ampicillin/Gentamicin (-)     Plan: Discontinue Amp/Gent     • Encounter for nutritional assessment 2021     Note Last Updated: 2021     Assessment: Mother with intentions to " breast feed, and is OK with formula if needed. Unable to place PIV for antibiotics on admission, thus a UVC was placed. Glucoses WNL.     Access: UVC (-)    Plan: PO ad abrahan feeds MBM/Similac Advance     • Healthcare maintenance 2021     Note Last Updated: 2021     Assessment and Plan:  Mom Name: Afua Coleman    Parent(s)/Caregiver(s) Contact Info:   Home phone: [phone]    [place] Testing  CCHD Critical Congen Heart Defect Test Result: pass (21)   Car Seat Challenge Test  N/A   Hearing Screen      [place] Screen Metabolic Screen Results:  (completed ) (21)     Circumcision-- Parents desire circ  Free T4/TSH  ROP screen  Hepatitis B vaccine  PCP-- Dr. Iqbal, [facility]    Safe Sleep: Infant has a central line so will provide MODIFIED SAFE SLEEP PRACTICES. This requires HOB flat, head position aid only, using sleep sack only if in open crib               IMMEDIATE PLAN OF CARE:      As indicated in active problem list and/or as listed as below. The plan of care has been / will be discussed with the family/primary caregiver(s) by Bedside    INTENSIVE/WEIGHT BASED: This patient is under constant supervision by the health care team and is requiring IV antibiotics for possible sepsis and laboratory monitoring. Current status and treatment plan delineated in above problem list.    Mich Edward MD  Attending Neonatologist  [facility] - Neonatology   [facility]    Documentation reviewed and electronically signed on 2021 at 08:33 EDT

## 2025-05-17 LAB
ANION GAP SERPL CALCULATED.3IONS-SCNC: 12 MMOL/L (ref 9–17)
ANION GAP SERPL CALCULATED.3IONS-SCNC: 12 MMOL/L (ref 9–17)
BUN SERPL-MCNC: 20 MG/DL (ref 7–20)
BUN SERPL-MCNC: 20 MG/DL (ref 7–20)
CALCIUM SERPL-MCNC: 7.9 MG/DL (ref 8.3–10.6)
CALCIUM SERPL-MCNC: 8 MG/DL (ref 8.3–10.6)
CHLORIDE SERPL-SCNC: 110 MMOL/L (ref 99–110)
CHLORIDE SERPL-SCNC: 112 MMOL/L (ref 99–110)
CO2 SERPL-SCNC: 20 MMOL/L (ref 21–32)
CO2 SERPL-SCNC: 20 MMOL/L (ref 21–32)
CREAT SERPL-MCNC: 2.9 MG/DL (ref 0.6–1.1)
CREAT SERPL-MCNC: 2.9 MG/DL (ref 0.6–1.1)
GFR, ESTIMATED: 16 ML/MIN/1.73M2
GFR, ESTIMATED: 16 ML/MIN/1.73M2
GLUCOSE SERPL-MCNC: 171 MG/DL (ref 74–99)
GLUCOSE SERPL-MCNC: 202 MG/DL (ref 74–99)
MAGNESIUM SERPL-MCNC: 2 MG/DL (ref 1.8–2.4)
POTASSIUM SERPL-SCNC: 3.7 MMOL/L (ref 3.5–5.1)
POTASSIUM SERPL-SCNC: 3.8 MMOL/L (ref 3.5–5.1)
SODIUM SERPL-SCNC: 142 MMOL/L (ref 136–145)
SODIUM SERPL-SCNC: 144 MMOL/L (ref 136–145)

## 2025-05-17 PROCEDURE — 2500000003 HC RX 250 WO HCPCS: Performed by: INTERNAL MEDICINE

## 2025-05-17 PROCEDURE — 6360000002 HC RX W HCPCS: Performed by: INTERNAL MEDICINE

## 2025-05-17 PROCEDURE — 80048 BASIC METABOLIC PNL TOTAL CA: CPT

## 2025-05-17 PROCEDURE — 83735 ASSAY OF MAGNESIUM: CPT

## 2025-05-17 PROCEDURE — 2580000003 HC RX 258: Performed by: INTERNAL MEDICINE

## 2025-05-17 PROCEDURE — 94761 N-INVAS EAR/PLS OXIMETRY MLT: CPT

## 2025-05-17 PROCEDURE — 36415 COLL VENOUS BLD VENIPUNCTURE: CPT

## 2025-05-17 PROCEDURE — 6370000000 HC RX 637 (ALT 250 FOR IP): Performed by: INTERNAL MEDICINE

## 2025-05-17 PROCEDURE — 1200000000 HC SEMI PRIVATE

## 2025-05-17 RX ORDER — POTASSIUM CHLORIDE 1500 MG/1
40 TABLET, EXTENDED RELEASE ORAL ONCE
Status: COMPLETED | OUTPATIENT
Start: 2025-05-17 | End: 2025-05-17

## 2025-05-17 RX ADMIN — LEVOTHYROXINE SODIUM 25 MCG: 0.03 TABLET ORAL at 06:33

## 2025-05-17 RX ADMIN — ATORVASTATIN CALCIUM 80 MG: 40 TABLET, FILM COATED ORAL at 21:19

## 2025-05-17 RX ADMIN — HYDROCHLOROTHIAZIDE 12.5 MG: 12.5 TABLET ORAL at 10:19

## 2025-05-17 RX ADMIN — POTASSIUM CHLORIDE 40 MEQ: 1500 TABLET, EXTENDED RELEASE ORAL at 10:20

## 2025-05-17 RX ADMIN — EMPAGLIFLOZIN 10 MG: 10 TABLET, FILM COATED ORAL at 10:18

## 2025-05-17 RX ADMIN — SODIUM CHLORIDE, PRESERVATIVE FREE 10 ML: 5 INJECTION INTRAVENOUS at 21:19

## 2025-05-17 RX ADMIN — ENOXAPARIN SODIUM 30 MG: 100 INJECTION SUBCUTANEOUS at 10:19

## 2025-05-17 RX ADMIN — ACETAMINOPHEN 650 MG: 325 TABLET ORAL at 21:19

## 2025-05-17 RX ADMIN — CLONIDINE HYDROCHLORIDE 0.1 MG: 0.1 TABLET ORAL at 10:19

## 2025-05-17 RX ADMIN — FUROSEMIDE 5 MG/HR: 10 INJECTION, SOLUTION INTRAMUSCULAR; INTRAVENOUS at 14:39

## 2025-05-17 RX ADMIN — HYDRALAZINE HYDROCHLORIDE 100 MG: 50 TABLET ORAL at 21:19

## 2025-05-17 RX ADMIN — HYDRALAZINE HYDROCHLORIDE 100 MG: 50 TABLET ORAL at 10:18

## 2025-05-17 RX ADMIN — SODIUM CHLORIDE, PRESERVATIVE FREE 10 ML: 5 INJECTION INTRAVENOUS at 10:21

## 2025-05-17 ASSESSMENT — PAIN SCALES - GENERAL
PAINLEVEL_OUTOF10: 2
PAINLEVEL_OUTOF10: 0

## 2025-05-17 NOTE — CONSULTS
Nephrology Service Consultation      [address] [place] [address]  Phone: [phone]  Office Hours: 8:30AM - 4:30PM  Monday - Friday        MEDICAL DECISION MAKING and Recommendations   -Hypervolemia  -Hypokalemia  -MARCO  -CKD4  -HTN  -Proteinuria: Known to have proteinuria, likely from diabetic nephropathy    Suggest;  -Echo  -Give 40meq KCl now  -BMP needed  -Obtain magnesium level  -Measure UOP in mL please  -Limit oral fluids to 1.5L  -Obtain UP/C  -If IV contrast needed then discuss risks and benefits with pt before proceeding    Thank you        Patient Active Problem List    Diagnosis Date Noted    Stage 3b chronic kidney disease ([facility]) 12/21/2022    Secondary hypertension 12/21/2022    Persistent proteinuria 12/21/2022    Pulmonary hypertension ([facility]) 11/02/2022    ILD (interstitial lung disease) ([facility]) 09/19/2022    Obesity (BMI 30-39.9) 09/19/2022    SOB (shortness of breath) 09/19/2022    Primary hypertension 05/16/2025    Dyslipidemia 05/16/2025    Bilateral leg edema 05/16/2025    Anasarca 05/16/2025    CKD (chronic kidney disease) stage 4, GFR 15-29 ml/min ([facility]) 05/23/2024    Nephrotic syndrome 12/05/2023    Type 2 diabetes mellitus with stage 3b chronic kidney disease, without long-term current use of insulin ([facility]) 10/10/2023         Patient:  Angela Pabon  MRN: [id number]  Consulting physician:  Jerri Ferrara MD  Reason for Consult: hypokalemia, elevated creatinine, office pt  PCP: Willa Ravi MD    HISTORY OF PRESENT ILLNESS:   The patient is a 60 y.o. female with CKD4, presented due to swelling all over her body.  She has been out of lasix for about 1 week now.  Nephrology consult for she is an office pt and creat 2.7 and potassium level low at 2.9.  She denies dyspnea  She is hemodynamically stable  She is on lasix drip    REVIEW OF SYSTEMS:  14 point ROS is Negative. See positive ROS per HPI    Past Medical History:        Diagnosis Date    Acute hypoxemic respiratory  History:   TOBACCO:   reports that she has never smoked. She has never used smokeless tobacco.  ETOH:   reports no history of alcohol use.  OCCUPATION:      Family History:   No family history on file.  Physical Exam:    Vitals: BP (!) 157/77   Pulse 67   Temp 98.7 °F (37.1 °C) (Oral)   Resp 16   Ht 1.651 m (5' 5\")   Wt 112.9 kg (248 lb 14.4 oz)   SpO2 98%   BMI 41.42 kg/m²   General appearance: in no acute distress, appears stated age  Skin: Skin color, texture, turgor normal. No rashes or lesions  HEENT: normocephalic, atraumatic  Neck: supple, trachea midline  Lungs: breathing comfortably  Heart:: regular rate and rhythm,   Abdomen: soft, non-tender; edematous  Extremities: edematous in all extremities  Neurologic: Mental status: alert, oriented, interactive, following commands  Psychiatric: mood and affect appropriate     CBC:   Recent Labs     05/16/25  1119   WBC 4.8   HGB 8.7*        BMP:    Recent Labs     05/16/25  1119 05/16/25  1936    141   K 2.9* 3.5   * 112*   CO2 18* 17*   BUN 19 19   CREATININE 2.9* 2.7*   GLUCOSE 134* 142*     Hepatic:   Recent Labs     05/16/25  1119   AST 40*   ALT 32   BILITOT 0.7   ALKPHOS 111              Electronically signed by Fili Colon DO on 5/17/2025 at 7:41 AM

## 2025-05-17 NOTE — PLAN OF CARE
Problem: Chronic Conditions and Co-morbidities  Goal: Patient's chronic conditions and co-morbidity symptoms are monitored and maintained or improved  5/17/2025 0308 by Mirela Montoya RN  Outcome: Progressing  5/16/2025 1841 by Mayra Quiroga, RN  Outcome: Progressing     Problem: Discharge Planning  Goal: Discharge to home or other facility with appropriate resources  Outcome: Progressing     Problem: Pain  Goal: Verbalizes/displays adequate comfort level or baseline comfort level  Outcome: Progressing     Problem: Safety - Adult  Goal: Free from fall injury  5/17/2025 0308 by Mirela Montoya RN  Outcome: Progressing  5/16/2025 1841 by Mayra Quiroga, RN  Outcome: Progressing

## 2025-05-17 NOTE — PROGRESS NOTES
V2.0    [facility] Progress Note      Name:  Angela Pabon /Age/Sex: 1965  (60 y.o. female)   MRN & CSN:  [id number] & [id number] Encounter Date/Time: 2025 7:43 AM EDT   Location:  [address] [place] [address] PCP: Willa Ravi MD     Attending:Desire Gandhi MD       Hospital Day: 2    Assessment and Recommendations   Angela Pabon is a 60 y.o. female  who presents with Anasarca      Plan:   Anasarca  Dyspnea on exertion  MARCO on CKD stage IV :creat 2.9-->2.7-->2.9  Hypokalemia, resolved.  2.9-->3.5  Troponemia likely 2/2 CKD: 187-->166  BNP 20,147. Mg+2.0  - Presenting complaint-shortness of breath, dyspnea on exertion, diffuse swelling  - Initially seen at cardiology clinic today for same complaint.  proBNP level 19,545.  Weight gain around 50 pounds unclear duration.  - Likely combination of CKD and CHF and her being off of her diuretics  - Started patient on IV Lasix 40 Mg.  Given the severity of patient's anasarca, will initiate patient on Lasix drip 5 mg/hr.  Escalate dose if needed based on response.  - Check BMP twice daily while on Lasix drip  - Aggressively replace potassium  - Obtain echo  -Fluid restriction to 1.5 L  - Consult cardiology if needed   - Nephrology team is on board        Essential hypertension, accelerated hypertension  -continue clonidine 0.1 Mg daily, hydrochlorothiazide 12.5 Mg daily, hydralazine 100 Mg twice daily, as needed IV hydralazine and labetalol    Anemia of chronic disease  Hgb 8.7  -Monitor H& H         Diet ADULT DIET; Regular; No Added Salt (3-4 gm); 1500 ml   DVT Prophylaxis [x] Lovenox, []  Heparin, [] SCDs, [] Ambulation,  [] Eliquis, [] Xarelto  [] Coumadin   Code Status Full Code   Disposition From: Home  Expected Disposition: Home  Estimated Date of Discharge: 2-3 days  Patient requires continued admission due to MARCO   Surrogate Decision Maker/ POA       Personally reviewed Lab Studies and Imaging     Discussed management of the case with Dr. Gandhi       EKG  chloride 0.9 % 100 mL infusion 5 mg/hr (05/16/25 8477)    sodium chloride       PRN Meds: hydrALAZINE, 10 mg, Q4H PRN  labetalol, 10 mg, Q4H PRN  sulfur hexafluoride microspheres, 2 mL, ONCE PRN  sodium chloride flush, 5-40 mL, PRN  sodium chloride, , PRN  ondansetron, 4 mg, Q8H PRN   Or  ondansetron, 4 mg, Q6H PRN  polyethylene glycol, 17 g, Daily PRN  acetaminophen, 650 mg, Q6H PRN   Or  acetaminophen, 650 mg, Q6H PRN        Labs and Imaging   XR CHEST PORTABLE  Result Date: 5/16/2025  Reason for exam: Chest Pain Comparison:  CT chest 10/20/2022 Portable CHEST single view 5/16/2025 at 1100 hours Findings: There is some groundglass increased density in both lung bases greater on the right. Differentials would include developing infiltrate or congestion. These findings are similar to those seen on CT study of 10/20/2022. Heart is upper limits of normal in size. Upper lung zones are clear. IMPRESSION: Groundglass infiltrative changes in both lung bases greater on the right.   Dictated and Electronically Signed By: Lucas Baeza [facility] 5/16/2025 11:24          CBC:   Recent Labs     05/16/25  1119   WBC 4.8   HGB 8.7*        BMP:    Recent Labs     05/16/25  1119 05/16/25  1936    141   K 2.9* 3.5   * 112*   CO2 18* 17*   BUN 19 19   CREATININE 2.9* 2.7*   GLUCOSE 134* 142*     Hepatic:   Recent Labs     05/16/25  1119   AST 40*   ALT 32   BILITOT 0.7   ALKPHOS 111     Lipids:   Lab Results   Component Value Date/Time    CHOL 187 05/06/2025 10:09 AM    HDL 63 05/06/2025 10:09 AM    TRIG 81 05/06/2025 10:09 AM     Hemoglobin A1C:   Lab Results   Component Value Date/Time    LABA1C 5.0 05/06/2025 09:49 AM     TSH:   Lab Results   Component Value Date/Time    TSH 5.73 05/06/2025 10:09 AM     Troponin: No results found for: \"TROPONINT\"  Lactic Acid: No results for input(s): \"LACTA\" in the last 72 hours.  BNP:   Recent Labs     05/16/25  1119   PROBNP 20,147*     UA:  Lab  Results   Component Value Date/Time    NITRU NEGATIVE 05/16/2025 05:42 PM    COLORU Yellow 05/16/2025 05:42 PM    PHUR 6.0 05/16/2025 05:42 PM    LABCAST None seen 07/12/2024 08:30 AM    WBCUA 4 05/16/2025 05:42 PM    RBCUA 3 05/16/2025 05:42 PM    MUCUS RARE 05/16/2025 05:42 PM    BACTERIA None seen 07/12/2024 08:30 AM    CLARITYU Clear 07/12/2024 08:30 AM    LEUKOCYTESUR NEGATIVE 05/16/2025 05:42 PM    UROBILINOGEN 0.2 05/16/2025 05:42 PM    BILIRUBINUR NEGATIVE 05/16/2025 05:42 PM    BLOODU Negative 07/12/2024 08:30 AM    GLUCOSEU 100 05/16/2025 05:42 PM    KETUA NEGATIVE 05/16/2025 05:42 PM     Urine Cultures: No results found for: \"LABURIN\"  Blood Cultures: No results found for: \"BC\"  No results found for: \"BLOODCULT2\"  Organism: No results found for: \"ORG\"      Electronically signed by REHANA Roe CNP on 5/17/2025 at 7:43 AM

## 2025-05-18 LAB
ANION GAP SERPL CALCULATED.3IONS-SCNC: 11 MMOL/L (ref 9–17)
ANION GAP SERPL CALCULATED.3IONS-SCNC: 11 MMOL/L (ref 9–17)
BASOPHILS # BLD: 0.03 K/UL
BASOPHILS NFR BLD: 1 % (ref 0–1)
BUN SERPL-MCNC: 20 MG/DL (ref 7–20)
BUN SERPL-MCNC: 21 MG/DL (ref 7–20)
CALCIUM SERPL-MCNC: 7.7 MG/DL (ref 8.3–10.6)
CALCIUM SERPL-MCNC: 7.7 MG/DL (ref 8.3–10.6)
CHLORIDE SERPL-SCNC: 112 MMOL/L (ref 99–110)
CHLORIDE SERPL-SCNC: 112 MMOL/L (ref 99–110)
CO2 SERPL-SCNC: 21 MMOL/L (ref 21–32)
CO2 SERPL-SCNC: 22 MMOL/L (ref 21–32)
CREAT SERPL-MCNC: 2.9 MG/DL (ref 0.6–1.1)
CREAT SERPL-MCNC: 3 MG/DL (ref 0.6–1.1)
EOSINOPHIL # BLD: 0.05 K/UL
EOSINOPHILS RELATIVE PERCENT: 2 % (ref 0–3)
ERYTHROCYTE [DISTWIDTH] IN BLOOD BY AUTOMATED COUNT: 15.3 % (ref 11.7–14.9)
GFR, ESTIMATED: 16 ML/MIN/1.73M2
GFR, ESTIMATED: 16 ML/MIN/1.73M2
GLUCOSE SERPL-MCNC: 117 MG/DL (ref 74–99)
GLUCOSE SERPL-MCNC: 181 MG/DL (ref 74–99)
HCT VFR BLD AUTO: 30.1 % (ref 37–47)
HGB BLD-MCNC: 9.1 G/DL (ref 12.5–16)
IMM GRANULOCYTES # BLD AUTO: 0.01 K/UL
IMM GRANULOCYTES NFR BLD: 0 %
LYMPHOCYTES NFR BLD: 0.66 K/UL
LYMPHOCYTES RELATIVE PERCENT: 21 % (ref 24–44)
MAGNESIUM SERPL-MCNC: 1.9 MG/DL (ref 1.8–2.4)
MCH RBC QN AUTO: 27.3 PG (ref 27–31)
MCHC RBC AUTO-ENTMCNC: 30.2 G/DL (ref 32–36)
MCV RBC AUTO: 90.4 FL (ref 78–100)
MONOCYTES NFR BLD: 0.35 K/UL
MONOCYTES NFR BLD: 11 % (ref 0–5)
NEUTROPHILS NFR BLD: 66 % (ref 36–66)
NEUTS SEG NFR BLD: 2.11 K/UL
PHOSPHATE SERPL-MCNC: 3.1 MG/DL (ref 2.5–4.9)
PLATELET # BLD AUTO: 301 K/UL (ref 140–440)
PMV BLD AUTO: 10.9 FL (ref 7.5–11.1)
POTASSIUM SERPL-SCNC: 3.9 MMOL/L (ref 3.5–5.1)
POTASSIUM SERPL-SCNC: 3.9 MMOL/L (ref 3.5–5.1)
RBC # BLD AUTO: 3.33 M/UL (ref 4.2–5.4)
SODIUM SERPL-SCNC: 144 MMOL/L (ref 136–145)
SODIUM SERPL-SCNC: 145 MMOL/L (ref 136–145)
WBC OTHER # BLD: 3.2 K/UL (ref 4–10.5)

## 2025-05-18 PROCEDURE — 94761 N-INVAS EAR/PLS OXIMETRY MLT: CPT

## 2025-05-18 PROCEDURE — 80048 BASIC METABOLIC PNL TOTAL CA: CPT

## 2025-05-18 PROCEDURE — 6360000002 HC RX W HCPCS: Performed by: INTERNAL MEDICINE

## 2025-05-18 PROCEDURE — 85025 COMPLETE CBC W/AUTO DIFF WBC: CPT

## 2025-05-18 PROCEDURE — 2580000003 HC RX 258: Performed by: INTERNAL MEDICINE

## 2025-05-18 PROCEDURE — 6370000000 HC RX 637 (ALT 250 FOR IP): Performed by: INTERNAL MEDICINE

## 2025-05-18 PROCEDURE — 84100 ASSAY OF PHOSPHORUS: CPT

## 2025-05-18 PROCEDURE — 83735 ASSAY OF MAGNESIUM: CPT

## 2025-05-18 PROCEDURE — 2500000003 HC RX 250 WO HCPCS: Performed by: INTERNAL MEDICINE

## 2025-05-18 PROCEDURE — 1200000000 HC SEMI PRIVATE

## 2025-05-18 RX ORDER — POTASSIUM CHLORIDE 1500 MG/1
40 TABLET, EXTENDED RELEASE ORAL ONCE
Status: COMPLETED | OUTPATIENT
Start: 2025-05-18 | End: 2025-05-18

## 2025-05-18 RX ADMIN — ATORVASTATIN CALCIUM 80 MG: 40 TABLET, FILM COATED ORAL at 20:56

## 2025-05-18 RX ADMIN — SODIUM CHLORIDE, PRESERVATIVE FREE 10 ML: 5 INJECTION INTRAVENOUS at 20:52

## 2025-05-18 RX ADMIN — POTASSIUM CHLORIDE 40 MEQ: 1500 TABLET, EXTENDED RELEASE ORAL at 08:35

## 2025-05-18 RX ADMIN — HYDRALAZINE HYDROCHLORIDE 100 MG: 50 TABLET ORAL at 20:56

## 2025-05-18 RX ADMIN — LEVOTHYROXINE SODIUM 25 MCG: 0.03 TABLET ORAL at 06:59

## 2025-05-18 RX ADMIN — ENOXAPARIN SODIUM 30 MG: 100 INJECTION SUBCUTANEOUS at 08:35

## 2025-05-18 RX ADMIN — CLONIDINE HYDROCHLORIDE 0.1 MG: 0.1 TABLET ORAL at 08:35

## 2025-05-18 RX ADMIN — SODIUM CHLORIDE, PRESERVATIVE FREE 10 ML: 5 INJECTION INTRAVENOUS at 08:34

## 2025-05-18 RX ADMIN — HYDRALAZINE HYDROCHLORIDE 100 MG: 50 TABLET ORAL at 08:35

## 2025-05-18 RX ADMIN — ACETAMINOPHEN 650 MG: 325 TABLET ORAL at 20:56

## 2025-05-18 RX ADMIN — FUROSEMIDE 5 MG/HR: 10 INJECTION, SOLUTION INTRAMUSCULAR; INTRAVENOUS at 17:58

## 2025-05-18 ASSESSMENT — PAIN SCALES - GENERAL: PAINLEVEL_OUTOF10: 3

## 2025-05-18 NOTE — PLAN OF CARE
Problem: Chronic Conditions and Co-morbidities  Goal: Patient's chronic conditions and co-morbidity symptoms are monitored and maintained or improved  5/17/2025 2128 by Mirela Montoya RN  Outcome: Progressing  5/17/2025 1857 by Jaylon Alberto LPN  Outcome: Progressing     Problem: Discharge Planning  Goal: Discharge to home or other facility with appropriate resources  5/17/2025 2128 by Mirela Montoya RN  Outcome: Progressing  5/17/2025 1857 by Jaylon Alberto LPN  Outcome: Progressing     Problem: Pain  Goal: Verbalizes/displays adequate comfort level or baseline comfort level  5/17/2025 2128 by Mirela Montoya RN  Outcome: Progressing  5/17/2025 1857 by Jaylon Alberto LPN  Outcome: Progressing     Problem: Safety - Adult  Goal: Free from fall injury  5/17/2025 2128 by Mirela Montoya RN  Outcome: Progressing  5/17/2025 1857 by Jaylon Alberto LPN  Outcome: Progressing

## 2025-05-18 NOTE — PROGRESS NOTES
V2.0    [facility] Progress Note      Name:  Angela Pabon /Age/Sex: 1965  (60 y.o. female)   MRN & CSN:  [id number] & [id number] Encounter Date/Time: 2025 8:30 AM EDT   Location:  [address] [place] [address] PCP: Willa Ravi MD     Attending:Desire Gandhi MD       Hospital Day: 3    Assessment and Recommendations   Angela Pabon is a 60 y.o. female  who presents with Anasarca      Plan:   Anasarca  Dyspnea on exertion  MARCO on CKD stage IV :creat 2.9-->2.7-->2.9-->2.9  Hypokalemia, resolved.  2.9-->3.5-->3.9  Troponemia likely 2/2 CKD: 187-->166  BNP 20,147. Mg+2.0  - Presenting complaint-shortness of breath, dyspnea on exertion, diffuse swelling  - Initially seen at cardiology clinic today for same complaint.  proBNP level 19,545.  Weight gain around 50 pounds unclear duration.  - Likely combination of CKD and CHF and her being off of her diuretics  - Started patient on IV Lasix 40 Mg.  Given the severity of patient's anasarca, will initiate patient on Lasix drip 5 mg/hr.  Escalate dose if needed based on response.  - Check BMP twice daily while on Lasix drip  - Aggressively replace potassium  - Echo pending  -Fluid restriction to 1.5 L  - Consult cardiology if needed   - Nephrology team is on board        Essential hypertension, accelerated hypertension  -continue clonidine 0.1 Mg daily, hydrochlorothiazide 12.5 Mg daily, hydralazine 100 Mg twice daily, as needed IV hydralazine and labetalol     Anemia of chronic disease  Hgb 8.7 on admission. Hgb 9.1  -Monitor H& H        Diet ADULT DIET; Regular; No Added Salt (3-4 gm); 1500 ml   DVT Prophylaxis [x] Lovenox, []  Heparin, [] SCDs, [] Ambulation,  [] Eliquis, [] Xarelto  [] Coumadin   Code Status Full Code   Disposition From: Home  Expected Disposition: Home  Estimated Date of Discharge: 2 days  Patient requires continued admission due to CHF   Surrogate Decision Maker/ POA       Personally reviewed Lab Studies and Imaging     Discussed management of the  case with Dr. Gandhi     Drugs that require monitoring for toxicity include Lasix and the method of monitoring was creat and electrolytes        Subjective:     Chief Complaint: \" I am feeling better.\"    Angela Pabon is a 60 y.o. female who presents with CHF. Patient reports some improvement, but she is not at baseline. Will continue to diurese her. Echo pending.       Review of Systems:      Pertinent positives and negatives discussed in HPI    Objective:     Intake/Output Summary (Last 24 hours) at 5/18/2025 0830  Last data filed at 5/18/2025 0810  Gross per 24 hour   Intake 600 ml   Output --   Net 600 ml      Vitals:   Vitals:    05/17/25 1518 05/17/25 2115 05/18/25 0030 05/18/25 0222   BP: (!) 154/76 (!) 161/77 134/65 (!) 160/79   Pulse: 66 72 73 82   Resp: 16 16  18   Temp: 97.9 °F (36.6 °C) 98 °F (36.7 °C)  98.2 °F (36.8 °C)   TempSrc: Oral Oral  Oral   SpO2: 95% 96%  96%   Weight:    108.2 kg (238 lb 8.6 oz)   Height:             Physical Exam:      General: NAD. Obese  Eyes: EOMI  ENT: neck supple  Cardiovascular: Regular rate.  Respiratory: Diminished with crackles to auscultation  Gastrointestinal: Soft, non tender  Genitourinary: no suprapubic tenderness  Musculoskeletal: 2+ edema  Skin: warm, dry  Neuro: Alert.  Psych: Mood appropriate.         Medications:   Medications:    potassium chloride  40 mEq Oral Once    potassium chloride  10 mEq IntraVENous Once    atorvastatin  80 mg Oral Nightly    cloNIDine  0.1 mg Oral Daily    empagliflozin  10 mg Oral Daily    hydrALAZINE  100 mg Oral BID    levothyroxine  25 mcg Oral QAM AC    sodium chloride flush  5-40 mL IntraVENous 2 times per day    enoxaparin  30 mg SubCUTAneous Daily      Infusions:    furosemide (LASIX) 100 mg in sodium chloride 0.9 % 100 mL infusion 5 mg/hr (05/17/25 1439)    sodium chloride       PRN Meds: hydrALAZINE, 10 mg, Q4H PRN  labetalol, 10 mg, Q4H PRN  sulfur hexafluoride microspheres, 2 mL, ONCE PRN  sodium chloride flush, 5-40

## 2025-05-18 NOTE — PROGRESS NOTES
Nephrology Progress Note        [address] [place] [address]  Phone: [phone]  Office Hours: 8:30AM - 4:30PM  Monday - Friday 5/18/2025 7:13 AM  Subjective:   Admit Date: 5/16/2025  PCP: Willa Ravi MD  Interval History:   Doing ok      Diet: ADULT DIET; Regular; No Added Salt (3-4 gm); 1500 ml      Data:   Scheduled Meds:   potassium chloride  10 mEq IntraVENous Once    atorvastatin  80 mg Oral Nightly    cloNIDine  0.1 mg Oral Daily    empagliflozin  10 mg Oral Daily    hydrALAZINE  100 mg Oral BID    hydroCHLOROthiazide  12.5 mg Oral Daily    levothyroxine  25 mcg Oral QAM AC    sodium chloride flush  5-40 mL IntraVENous 2 times per day    enoxaparin  30 mg SubCUTAneous Daily     Continuous Infusions:   furosemide (LASIX) 100 mg in sodium chloride 0.9 % 100 mL infusion 5 mg/hr (05/17/25 1439)    sodium chloride       PRN Meds:hydrALAZINE, labetalol, sulfur hexafluoride microspheres, sodium chloride flush, sodium chloride, ondansetron **OR** ondansetron, polyethylene glycol, acetaminophen **OR** acetaminophen  I/O last 3 completed shifts:  In: 480 [P.O.:480]  Out: 600 [Urine:600]  No intake/output data recorded.    Intake/Output Summary (Last 24 hours) at 5/18/2025 0713  Last data filed at 5/17/2025 2117  Gross per 24 hour   Intake 360 ml   Output --   Net 360 ml       CBC:   Recent Labs     05/16/25  1119   WBC 4.8   HGB 8.7*          BMP:    Recent Labs     05/16/25  1936 05/17/25  1225 05/17/25  1850    142 144   K 3.5 3.8 3.7   * 110 112*   CO2 17* 20* 20*   BUN 19 20 20   CREATININE 2.7* 2.9* 2.9*   GLUCOSE 142* 171* 202*   CALCIUM 8.2* 8.0* 7.9*     Hepatic:   Recent Labs     05/16/25  1119   AST 40*   ALT 32   BILITOT 0.7   ALKPHOS 111     Troponin: No results for input(s): \"TROPONINI\" in the last 72 hours.  BNP: No results for input(s): \"BNP\" in the last 72 hours.  Lipids: No results for input(s): \"CHOL\", \"HDL\" in the last 72 hours.    Invalid input(s):

## 2025-05-19 ENCOUNTER — APPOINTMENT (OUTPATIENT)
Dept: NON INVASIVE DIAGNOSTICS | Age: 60
End: 2025-05-19
Attending: INTERNAL MEDICINE
Payer: COMMERCIAL

## 2025-05-19 LAB
ANION GAP SERPL CALCULATED.3IONS-SCNC: 10 MMOL/L (ref 9–17)
BASOPHILS # BLD: 0.03 K/UL
BASOPHILS NFR BLD: 1 % (ref 0–1)
BUN SERPL-MCNC: 21 MG/DL (ref 7–20)
CALCIUM SERPL-MCNC: 7.8 MG/DL (ref 8.3–10.6)
CHLORIDE SERPL-SCNC: 112 MMOL/L (ref 99–110)
CO2 SERPL-SCNC: 22 MMOL/L (ref 21–32)
CREAT SERPL-MCNC: 3 MG/DL (ref 0.6–1.1)
ECHO AO ASC DIAM: 2.9 CM
ECHO AO ASCENDING AORTA INDEX: 1.36 CM/M2
ECHO AO ROOT DIAM: 2.7 CM
ECHO AO ROOT INDEX: 1.27 CM/M2
ECHO AV AREA PEAK VELOCITY: 1.5 CM2
ECHO AV AREA VTI: 1.5 CM2
ECHO AV AREA/BSA PEAK VELOCITY: 0.7 CM2/M2
ECHO AV AREA/BSA VTI: 0.7 CM2/M2
ECHO AV MEAN GRADIENT: 11 MMHG
ECHO AV MEAN VELOCITY: 1.6 M/S
ECHO AV PEAK GRADIENT: 17 MMHG
ECHO AV PEAK VELOCITY: 2.1 M/S
ECHO AV VELOCITY RATIO: 0.57
ECHO AV VTI: 43.8 CM
ECHO BSA: 2.22 M2
ECHO EST RA PRESSURE: 15 MMHG
ECHO IVC PROX: 2.5 CM
ECHO LA AREA 4C: 24.1 CM2
ECHO LA DIAMETER INDEX: 2.02 CM/M2
ECHO LA DIAMETER: 4.3 CM
ECHO LA MAJOR AXIS: 6 CM
ECHO LA TO AORTIC ROOT RATIO: 1.59
ECHO LA VOL MOD A4C: 79 ML (ref 22–52)
ECHO LA VOLUME INDEX MOD A4C: 37 ML/M2 (ref 16–34)
ECHO LV E' LATERAL VELOCITY: 8 CM/S
ECHO LV E' SEPTAL VELOCITY: 6.7 CM/S
ECHO LV EDV A4C: 78 ML
ECHO LV EDV INDEX A4C: 37 ML/M2
ECHO LV EF PHYSICIAN: 55 %
ECHO LV EJECTION FRACTION A4C: 62 %
ECHO LV ESV A4C: 30 ML
ECHO LV ESV INDEX A4C: 14 ML/M2
ECHO LV FRACTIONAL SHORTENING: 21 % (ref 28–44)
ECHO LV INTERNAL DIMENSION DIASTOLE INDEX: 1.6 CM/M2
ECHO LV INTERNAL DIMENSION DIASTOLIC: 3.4 CM (ref 3.9–5.3)
ECHO LV INTERNAL DIMENSION SYSTOLIC INDEX: 1.27 CM/M2
ECHO LV INTERNAL DIMENSION SYSTOLIC: 2.7 CM
ECHO LV IVSD: 1.4 CM (ref 0.6–0.9)
ECHO LV MASS 2D: 166.2 G (ref 67–162)
ECHO LV MASS INDEX 2D: 78 G/M2 (ref 43–95)
ECHO LV POSTERIOR WALL DIASTOLIC: 1.4 CM (ref 0.6–0.9)
ECHO LV RELATIVE WALL THICKNESS RATIO: 0.82
ECHO LVOT AREA: 2.5 CM2
ECHO LVOT AV VTI INDEX: 0.58
ECHO LVOT DIAM: 1.8 CM
ECHO LVOT MEAN GRADIENT: 4 MMHG
ECHO LVOT PEAK GRADIENT: 6 MMHG
ECHO LVOT PEAK VELOCITY: 1.2 M/S
ECHO LVOT STROKE VOLUME INDEX: 30.3 ML/M2
ECHO LVOT SV: 64.6 ML
ECHO LVOT VTI: 25.4 CM
ECHO MV A VELOCITY: 0.46 M/S
ECHO MV E DECELERATION TIME (DT): 239 MS
ECHO MV E VELOCITY: 1.56 M/S
ECHO MV E/A RATIO: 3.39
ECHO MV E/E' LATERAL: 19.5
ECHO MV E/E' RATIO (AVERAGED): 21.39
ECHO MV E/E' SEPTAL: 23.28
ECHO RIGHT VENTRICULAR SYSTOLIC PRESSURE (RVSP): 47 MMHG
ECHO RV MID DIMENSION: 3.7 CM
ECHO TV REGURGITANT MAX VELOCITY: 2.85 M/S
ECHO TV REGURGITANT PEAK GRADIENT: 32 MMHG
EOSINOPHIL # BLD: 0.04 K/UL
EOSINOPHILS RELATIVE PERCENT: 1 % (ref 0–3)
ERYTHROCYTE [DISTWIDTH] IN BLOOD BY AUTOMATED COUNT: 15.4 % (ref 11.7–14.9)
GFR, ESTIMATED: 16 ML/MIN/1.73M2
GLUCOSE SERPL-MCNC: 145 MG/DL (ref 74–99)
HCT VFR BLD AUTO: 30.7 % (ref 37–47)
HGB BLD-MCNC: 9.3 G/DL (ref 12.5–16)
IMM GRANULOCYTES # BLD AUTO: 0.02 K/UL
IMM GRANULOCYTES NFR BLD: 1 %
LYMPHOCYTES NFR BLD: 0.63 K/UL
LYMPHOCYTES RELATIVE PERCENT: 18 % (ref 24–44)
MAGNESIUM SERPL-MCNC: 1.9 MG/DL (ref 1.8–2.4)
MCH RBC QN AUTO: 27.7 PG (ref 27–31)
MCHC RBC AUTO-ENTMCNC: 30.3 G/DL (ref 32–36)
MCV RBC AUTO: 91.4 FL (ref 78–100)
MONOCYTES NFR BLD: 0.35 K/UL
MONOCYTES NFR BLD: 10 % (ref 0–5)
NEUTROPHILS NFR BLD: 69 % (ref 36–66)
NEUTS SEG NFR BLD: 2.42 K/UL
PHOSPHATE SERPL-MCNC: 3.5 MG/DL (ref 2.5–4.9)
PLATELET # BLD AUTO: 276 K/UL (ref 140–440)
PMV BLD AUTO: 10.2 FL (ref 7.5–11.1)
POTASSIUM SERPL-SCNC: 3.8 MMOL/L (ref 3.5–5.1)
RBC # BLD AUTO: 3.36 M/UL (ref 4.2–5.4)
SODIUM SERPL-SCNC: 144 MMOL/L (ref 136–145)
WBC OTHER # BLD: 3.5 K/UL (ref 4–10.5)

## 2025-05-19 PROCEDURE — 80048 BASIC METABOLIC PNL TOTAL CA: CPT

## 2025-05-19 PROCEDURE — 2580000003 HC RX 258: Performed by: INTERNAL MEDICINE

## 2025-05-19 PROCEDURE — 94761 N-INVAS EAR/PLS OXIMETRY MLT: CPT

## 2025-05-19 PROCEDURE — 36592 COLLECT BLOOD FROM PICC: CPT

## 2025-05-19 PROCEDURE — 1200000000 HC SEMI PRIVATE

## 2025-05-19 PROCEDURE — 85025 COMPLETE CBC W/AUTO DIFF WBC: CPT

## 2025-05-19 PROCEDURE — 84100 ASSAY OF PHOSPHORUS: CPT

## 2025-05-19 PROCEDURE — 83735 ASSAY OF MAGNESIUM: CPT

## 2025-05-19 PROCEDURE — 93306 TTE W/DOPPLER COMPLETE: CPT

## 2025-05-19 PROCEDURE — 6370000000 HC RX 637 (ALT 250 FOR IP): Performed by: INTERNAL MEDICINE

## 2025-05-19 PROCEDURE — 6360000002 HC RX W HCPCS: Performed by: INTERNAL MEDICINE

## 2025-05-19 PROCEDURE — 93306 TTE W/DOPPLER COMPLETE: CPT | Performed by: INTERNAL MEDICINE

## 2025-05-19 RX ADMIN — ATORVASTATIN CALCIUM 80 MG: 40 TABLET, FILM COATED ORAL at 21:22

## 2025-05-19 RX ADMIN — LEVOTHYROXINE SODIUM 25 MCG: 0.03 TABLET ORAL at 05:51

## 2025-05-19 RX ADMIN — EMPAGLIFLOZIN 10 MG: 10 TABLET, FILM COATED ORAL at 09:06

## 2025-05-19 RX ADMIN — CLONIDINE HYDROCHLORIDE 0.1 MG: 0.1 TABLET ORAL at 08:07

## 2025-05-19 RX ADMIN — ACETAMINOPHEN 650 MG: 325 TABLET ORAL at 21:25

## 2025-05-19 RX ADMIN — ENOXAPARIN SODIUM 30 MG: 100 INJECTION SUBCUTANEOUS at 08:07

## 2025-05-19 RX ADMIN — HYDRALAZINE HYDROCHLORIDE 100 MG: 50 TABLET ORAL at 21:22

## 2025-05-19 RX ADMIN — HYDRALAZINE HYDROCHLORIDE 100 MG: 50 TABLET ORAL at 08:07

## 2025-05-19 RX ADMIN — FUROSEMIDE 5 MG/HR: 10 INJECTION, SOLUTION INTRAMUSCULAR; INTRAVENOUS at 16:40

## 2025-05-19 ASSESSMENT — PAIN SCALES - GENERAL: PAINLEVEL_OUTOF10: 0

## 2025-05-19 NOTE — PROGRESS NOTES
4 Eyes Skin Assessment     NAME:  Angela Pabon  YOB: 1965  MEDICAL RECORD NUMBER:  [id number]    The patient is being assessed for  Admission    I agree that at least one RN has performed a thorough Head to Toe Skin Assessment on the patient. ALL assessment sites listed below have been assessed.      Areas assessed by both nurses:    Head, Face, Ears, Shoulders, Back, Chest, Arms, Elbows, Hands, Sacrum. Buttock, Coccyx, Ischium, Legs. Feet and Heels, and Under Medical Devices         Does the Patient have a Wound? No noted wound(s)       Sourav Prevention initiated by RN: Yes  Wound Care Orders initiated by RN: No    Pressure Injury (Stage 3,4, Unstageable, DTI, NWPT, and Complex wounds) if present, place Wound referral order by RN under : No    New Ostomies, if present place, Ostomy referral order under : No     Nurse 1 eSignature: Electronically signed by Cora Guthrie LPN on 5/19/25 at 7:58 PM EDT    **SHARE this note so that the co-signing nurse can place an eSignature**    Nurse 2 eSignature: Electronically signed by KRZYSZTOF GREEN RN on 5/19/25 at 7:58 PM EDT

## 2025-05-19 NOTE — CARE COORDINATION
05/19/25 0856   Service Assessment   Patient Orientation Alert and Oriented   Cognition Alert   History Provided By Medical Record   Primary Caregiver Self   Support Systems Family Members   Patient's Healthcare Decision Maker is: Legal Next of Kin   PCP Verified by CM Yes   Prior Functional Level Independent in ADLs/IADLs   Current Functional Level Independent in ADLs/IADLs   Can patient return to prior living arrangement Yes   Ability to make needs known: Good   Family able to assist with home care needs: No   Would you like for me to discuss the discharge plan with any other family members/significant others, and if so, who? No   Financial Resources Other (Comment)   Community Resources None     Discharge planning initiated. Chart reviewed. Patient from home, independent prior to admission. PCP and insurance. No discharge needs identified at this time. CM following.

## 2025-05-19 NOTE — PROGRESS NOTES
V2.0    [facility] [place]      Name:  Angela Pabon /Age/Sex: 1965  (60 y.o. female)   MRN & CSN:  [id number] & [id number] Encounter Date/Time: 2025 8:04 AM EDT   Location:  [address] [place] [address] PCP: Willa Ravi MD     Attending:Desire Gandhi MD       Hospital Day: 4    Assessment and Recommendations   Angela Pabon is a 60 y.o. female  who presents with Anasarca      Plan:   Anasarca  Dyspnea on exertion  MARCO on CKD stage IV :creat 2.9-->2.7-->2.9-->2.9-->30  Hypokalemia, resolved.  2.9-->3.5-->3.9  Troponemia likely 2/2 CKD: 187-->166  BNP 20,147. Mg+2.0  - Presenting complaint-shortness of breath, dyspnea on exertion, diffuse swelling  - Initially seen at cardiology clinic today for same complaint.  proBNP level 19,545.  Weight gain around 50 pounds unclear duration.  - Likely combination of CKD and CHF and her being off of her diuretics  - Started patient on IV Lasix 40 Mg.  Given the severity of patient's anasarca, will initiate patient on Lasix drip 5 mg/hr.  Escalate dose if needed based on response.  - Check BMP twice daily while on Lasix drip  - Aggressively replace potassium  - Echo pending  -Fluid restriction to 1.5 L  - Nephrology team is on board. Patient now agreeable to  dialysis   -Plan to discharge on Bumex 2 mg BID and KCL 20 mEq po BID        Essential hypertension, accelerated hypertension  -continue clonidine 0.1 Mg daily, hydrochlorothiazide 12.5 Mg daily, hydralazine 100 Mg twice daily, as needed IV hydralazine and labetalol     Anemia of chronic disease  Hgb 8.7 on admission. Hgb 9.1  -Monitor H& H         Diet ADULT DIET; Regular; No Added Salt (3-4 gm); 1500 ml   DVT Prophylaxis [] Lovenox, [x]  Heparin, [] SCDs, [] Ambulation,  [] Eliquis, [] Xarelto  [] Coumadin   Code Status Full Code   Disposition From: Home  Expected Disposition: Home  Estimated Date of Discharge: 1-2 days  Patient requires continued admission due to CHF   Surrogate Decision Maker/ POA    sodium chloride 0.9 % 100 mL infusion 5 mg/hr (05/18/25 4628)    sodium chloride       PRN Meds: hydrALAZINE, 10 mg, Q4H PRN  labetalol, 10 mg, Q4H PRN  sulfur hexafluoride microspheres, 2 mL, ONCE PRN  sodium chloride flush, 5-40 mL, PRN  sodium chloride, , PRN  ondansetron, 4 mg, Q8H PRN   Or  ondansetron, 4 mg, Q6H PRN  polyethylene glycol, 17 g, Daily PRN  acetaminophen, 650 mg, Q6H PRN   Or  acetaminophen, 650 mg, Q6H PRN        Labs and Imaging   XR CHEST PORTABLE  Result Date: 5/16/2025  Reason for exam: Chest Pain Comparison:  CT chest 10/20/2022 Portable CHEST single view 5/16/2025 at 1100 hours Findings: There is some groundglass increased density in both lung bases greater on the right. Differentials would include developing infiltrate or congestion. These findings are similar to those seen on CT study of 10/20/2022. Heart is upper limits of normal in size. Upper lung zones are clear. IMPRESSION: Groundglass infiltrative changes in both lung bases greater on the right.   Dictated and Electronically Signed By: Lucas Baeza [facility] 5/16/2025 11:24          CBC:   Recent Labs     05/16/25  1119 05/18/25  0730 05/19/25  0540   WBC 4.8 3.2* 3.5*   HGB 8.7* 9.1* 9.3*    301 276     BMP:    Recent Labs     05/18/25  0730 05/18/25  1808 05/19/25  0540    145 144   K 3.9 3.9 3.8   * 112* 112*   CO2 21 22 22   BUN 20 21* 21*   CREATININE 2.9* 3.0* 3.0*   GLUCOSE 117* 181* 145*     Hepatic:   Recent Labs     05/16/25  1119   AST 40*   ALT 32   BILITOT 0.7   ALKPHOS 111     Lipids:   Lab Results   Component Value Date/Time    CHOL 187 05/06/2025 10:09 AM    HDL 63 05/06/2025 10:09 AM    TRIG 81 05/06/2025 10:09 AM     Hemoglobin A1C:   Lab Results   Component Value Date/Time    LABA1C 5.0 05/06/2025 09:49 AM     TSH:   Lab Results   Component Value Date/Time    TSH 5.73 05/06/2025 10:09 AM     Troponin: No results found for: \"TROPONINT\"  Lactic Acid: No results for

## 2025-05-19 NOTE — PROGRESS NOTES
Nephrology Progress Note        [address] [place] [address]  Phone: [phone]  Office Hours: 8:30AM - 4:30PM  Monday - Friday 5/19/2025 6:31 AM  Subjective:   Admit Date: 5/16/2025  PCP: Willa Ravi MD  Interval History:   Only 1L urine?    Diet: ADULT DIET; Regular; No Added Salt (3-4 gm); 1500 ml      Data:   Scheduled Meds:   potassium chloride  10 mEq IntraVENous Once    atorvastatin  80 mg Oral Nightly    cloNIDine  0.1 mg Oral Daily    empagliflozin  10 mg Oral Daily    hydrALAZINE  100 mg Oral BID    levothyroxine  25 mcg Oral QAM AC    sodium chloride flush  5-40 mL IntraVENous 2 times per day    enoxaparin  30 mg SubCUTAneous Daily     Continuous Infusions:   furosemide (LASIX) 100 mg in sodium chloride 0.9 % 100 mL infusion 5 mg/hr (05/18/25 1758)    sodium chloride       PRN Meds:hydrALAZINE, labetalol, sulfur hexafluoride microspheres, sodium chloride flush, sodium chloride, ondansetron **OR** ondansetron, polyethylene glycol, acetaminophen **OR** acetaminophen  I/O last 3 completed shifts:  In: 850 [P.O.:840; I.V.:10]  Out: -   I/O this shift:  In: 120 [P.O.:120]  Out: 1000 [Urine:1000]    Intake/Output Summary (Last 24 hours) at 5/19/2025 0631  Last data filed at 5/19/2025 0600  Gross per 24 hour   Intake 610 ml   Output 1000 ml   Net -390 ml       CBC:   Recent Labs     05/16/25  1119 05/18/25  0730 05/19/25  0540   WBC 4.8 3.2* 3.5*   HGB 8.7* 9.1* 9.3*    301 276       BMP:    Recent Labs     05/17/25  1850 05/18/25  0730 05/18/25  1808    144 145   K 3.7 3.9 3.9   * 112* 112*   CO2 20* 21 22   BUN 20 20 21*   CREATININE 2.9* 2.9* 3.0*   GLUCOSE 202* 117* 181*   CALCIUM 7.9* 7.7* 7.7*     Hepatic:   Recent Labs     05/16/25  1119   AST 40*   ALT 32   BILITOT 0.7   ALKPHOS 111         Objective:   Vitals: BP (!) 150/68   Pulse 71   Temp 97.9 °F (36.6 °C) (Oral)   Resp 16   Ht 1.651 m (5' 5\")   Wt 108.2 kg (238 lb 8.6 oz)   SpO2 96%   BMI 39.69

## 2025-05-20 ENCOUNTER — APPOINTMENT (OUTPATIENT)
Dept: INTERVENTIONAL RADIOLOGY/VASCULAR | Age: 60
End: 2025-05-20
Payer: COMMERCIAL

## 2025-05-20 LAB
ANION GAP SERPL CALCULATED.3IONS-SCNC: 11 MMOL/L (ref 9–17)
BUN SERPL-MCNC: 23 MG/DL (ref 7–20)
CALCIUM SERPL-MCNC: 7.7 MG/DL (ref 8.3–10.6)
CHLORIDE SERPL-SCNC: 110 MMOL/L (ref 99–110)
CO2 SERPL-SCNC: 23 MMOL/L (ref 21–32)
CREAT SERPL-MCNC: 3 MG/DL (ref 0.6–1.1)
GFR, ESTIMATED: 16 ML/MIN/1.73M2
GLUCOSE SERPL-MCNC: 168 MG/DL (ref 74–99)
HBV SURFACE AB SERPL IA-ACNC: 256 MIU/ML
HBV SURFACE AG SERPL QL IA: NONREACTIVE
POTASSIUM SERPL-SCNC: 3.6 MMOL/L (ref 3.5–5.1)
SODIUM SERPL-SCNC: 145 MMOL/L (ref 136–145)

## 2025-05-20 PROCEDURE — 80048 BASIC METABOLIC PNL TOTAL CA: CPT

## 2025-05-20 PROCEDURE — 1200000000 HC SEMI PRIVATE

## 2025-05-20 PROCEDURE — 87340 HEPATITIS B SURFACE AG IA: CPT

## 2025-05-20 PROCEDURE — 94761 N-INVAS EAR/PLS OXIMETRY MLT: CPT

## 2025-05-20 PROCEDURE — 86317 IMMUNOASSAY INFECTIOUS AGENT: CPT

## 2025-05-20 PROCEDURE — 06HY33Z INSERTION OF INFUSION DEVICE INTO LOWER VEIN, PERCUTANEOUS APPROACH: ICD-10-PCS | Performed by: STUDENT IN AN ORGANIZED HEALTH CARE EDUCATION/TRAINING PROGRAM

## 2025-05-20 PROCEDURE — 76937 US GUIDE VASCULAR ACCESS: CPT

## 2025-05-20 PROCEDURE — 77001 FLUOROGUIDE FOR VEIN DEVICE: CPT

## 2025-05-20 PROCEDURE — 6360000002 HC RX W HCPCS: Performed by: INTERNAL MEDICINE

## 2025-05-20 PROCEDURE — 36556 INSERT NON-TUNNEL CV CATH: CPT

## 2025-05-20 PROCEDURE — C1894 INTRO/SHEATH, NON-LASER: HCPCS

## 2025-05-20 PROCEDURE — 6360000002 HC RX W HCPCS: Performed by: RADIOLOGY

## 2025-05-20 PROCEDURE — 6370000000 HC RX 637 (ALT 250 FOR IP): Performed by: INTERNAL MEDICINE

## 2025-05-20 PROCEDURE — 6360000002 HC RX W HCPCS

## 2025-05-20 PROCEDURE — 36556 INSERT NON-TUNNEL CV CATH: CPT | Performed by: RADIOLOGY

## 2025-05-20 RX ORDER — LIDOCAINE HYDROCHLORIDE 10 MG/ML
INJECTION, SOLUTION EPIDURAL; INFILTRATION; INTRACAUDAL; PERINEURAL PRN
Status: COMPLETED | OUTPATIENT
Start: 2025-05-20 | End: 2025-05-20

## 2025-05-20 RX ADMIN — HYDRALAZINE HYDROCHLORIDE 100 MG: 50 TABLET ORAL at 08:51

## 2025-05-20 RX ADMIN — LABETALOL HYDROCHLORIDE 10 MG: 5 INJECTION, SOLUTION INTRAVENOUS at 18:29

## 2025-05-20 RX ADMIN — EMPAGLIFLOZIN 10 MG: 10 TABLET, FILM COATED ORAL at 08:51

## 2025-05-20 RX ADMIN — HYDRALAZINE HYDROCHLORIDE 100 MG: 50 TABLET ORAL at 20:20

## 2025-05-20 RX ADMIN — HYDRALAZINE HYDROCHLORIDE 10 MG: 20 INJECTION INTRAMUSCULAR; INTRAVENOUS at 21:59

## 2025-05-20 RX ADMIN — LEVOTHYROXINE SODIUM 25 MCG: 0.03 TABLET ORAL at 05:06

## 2025-05-20 RX ADMIN — LIDOCAINE HYDROCHLORIDE 6 ML: 10 INJECTION, SOLUTION EPIDURAL; INFILTRATION; INTRACAUDAL; PERINEURAL at 13:43

## 2025-05-20 RX ADMIN — ATORVASTATIN CALCIUM 80 MG: 40 TABLET, FILM COATED ORAL at 20:20

## 2025-05-20 RX ADMIN — CLONIDINE HYDROCHLORIDE 0.1 MG: 0.1 TABLET ORAL at 08:51

## 2025-05-20 ASSESSMENT — PAIN SCALES - GENERAL: PAINLEVEL_OUTOF10: 0

## 2025-05-20 NOTE — PROGRESS NOTES
Physician Progress Note      PATIENT:               KERRI LEVIN  CSN #:                  [id number]  :                       1965  ADMIT DATE:       2025 10:12 AM  DISCH DATE:  RESPONDING  PROVIDER #:        Juany Rios MD          QUERY TEXT:    Dear REHANA Roe-CNP,  Acute Kidney Injury is documented in the medical record - by PCP and   Nephrology .  Please provide additional clinical indicators supportive of your   documentation. Or please document if the diagnosis of MARCO has been ruled out   after study.    The clinical indicators include:  Hx HTN, DM2, CKD stage 4, HLD, interstitial lung disease  has been out Of HCTZ and Lasix since March  Presents with SOB, Anasarca, 50 lb wt gain, creat=2.9, GFR=17, recheck   creat=2.7   H and P notes CKD stage IV  notes \" Likely combination of CKD and CHF and her being off of her diuretics\"   Nephrology consult notes \"MARCO and CKD4  PCP notes \"MARCO on CKD stage IV :creat 2.9-->2.7-->2.9   PCP- \"MARCO on CKD stage IV :creat 2.9-->2.7-->2.9-->2.9\"   PCP-\"MARCO on CKD stage IV :creat 2.9-->2.7-->2.9-->2.9-->30   Neph-\"Cr stable at 3  -Will plan few days of UF with the dialysis machine, to help with the edema\"  Tx fluid restrictions, monitor labs, IV Lasix, Lasix gtt,  I and O  Options provided:  -- Acute kidney injury ruled out after study and CKD4 only  -- Acute kidney injury evidenced by, Please document evidence as well as a   numerical baseline creatinine, if known.  -- Other - I will add my own diagnosis  -- Disagree - Not applicable / Not valid  -- Disagree - Clinically unable to determine / Unknown  -- Refer to Clinical Documentation Reviewer    PROVIDER RESPONSE TEXT:    Acute kidney injury was ruled out after study and CKD 4 only.    Query created by: Eloise Reid on 2025 7:33 AM      Electronically signed by:  Juany Rios MD 2025 10:00 AM

## 2025-05-20 NOTE — CARE COORDINATION
Received referral for possible assistance at discharge.  Patient has not received assistance from [facility] prior to admission.  If she needs help with NEW medications at discharge, she is eligible for 1x $100 max.  Will continue to follow.

## 2025-05-20 NOTE — PROGRESS NOTES
TRANSFER - OUT REPORT:    Verbal report given to bipin hernandez(name) on Angela Pabon being transferred to dialysis(unit) for routine post-op       Report consisted of patient's Situation, Background, Assessment and   Recommendations(SBAR).     Information from the following report(s) Nurse Handoff Report was reviewed with the receiving nurse.    Opportunity for questions and clarification was provided.      Patient transported with:   Registered Nurse

## 2025-05-20 NOTE — PROGRESS NOTES
Patient Name: Angela Pabon  Patient : 1965  MRN: [id number]     Acct: [id number]  Date of Admission: 2025  Room/Bed: 1106/[facility]6A  Code Status:  Full Code  Allergies:   Allergies   Allergen Reactions    Nifedipine Shortness Of Breath    Penicillins Anaphylaxis    Amlodipine      Leg swelling    Metformin Other (See Comments)     Other reaction(s): Other - comment required  Kidney function affected  Kidney function affected       Diagnosis:    Patient Active Problem List   Diagnosis    ILD (interstitial lung disease) ([facility])    Obesity (BMI 30-39.9)    SOB (shortness of breath)    Pulmonary hypertension (HCC)    Stage 3b chronic kidney disease (HCC)    Secondary hypertension    Persistent proteinuria    Type 2 diabetes mellitus with stage 3b chronic kidney disease, without long-term current use of insulin ([facility])    Nephrotic syndrome    CKD (chronic kidney disease) stage 4, GFR 15-29 ml/min ([facility])    Primary hypertension    Dyslipidemia    Bilateral leg edema    Anasarca         Treatment:  Hemodialysis 1:1  Priority: Routine  Location: Acute Room    Diabetic: Yes  NPO: No  Isolation Precautions: None     Consent for Treatment Verified: Yes  Blood Consent Verified: Not Applicable     Safety Verified: Identify (I), Consent (C), Equipment (E), HepB Status (B), Orders Complete (O), Access Verified (A), and Timeliness (T)  Time out performed prior to access at 1400 hours.    Report Received from Primary RN at 1200 hours.  Primary RN (First Initial, Last Name, Title): ELVIA Avalos LPN   Incapacitated Nurse Education Completed: Yes     HBsAg ONLY:  Date Drawn: May 20, 2025       Results: Negative  HBsAb:  Date Drawn:  May 20, 2025       Results: Immune >10    Order  Dialysis Bath  K+ (Potassium): 3  Ca+ (Calcium): 2.5  Na+ (Sodium): 0  HCO3 (Bicarb): 0  Bicarbonate Concentrate Lot No.: [id number]  Acid Concentrate Lot No.: [id number]     Na+ Modeling: Not Applicable  Dialyzer: F180  Dialysate Temperature (C):

## 2025-05-20 NOTE — PROGRESS NOTES
Nephrology Progress Note        [address] [place] [address]  Phone: [phone]  Office Hours: 8:30AM - 4:30PM  Monday - Friday 5/20/2025 7:02 AM  Subjective:   Admit Date: 5/16/2025  PCP: Willa Ravi MD  Interval History:   On room air  Agreeable with HD    Diet: ADULT DIET; Regular; No Added Salt (3-4 gm); 1500 ml      Data:   Scheduled Meds:   potassium chloride  10 mEq IntraVENous Once    atorvastatin  80 mg Oral Nightly    cloNIDine  0.1 mg Oral Daily    empagliflozin  10 mg Oral Daily    hydrALAZINE  100 mg Oral BID    levothyroxine  25 mcg Oral QAM AC    sodium chloride flush  5-40 mL IntraVENous 2 times per day    enoxaparin  30 mg SubCUTAneous Daily     Continuous Infusions:   furosemide (LASIX) 100 mg in sodium chloride 0.9 % 100 mL infusion 5 mg/hr (05/19/25 1640)    sodium chloride       PRN Meds:hydrALAZINE, labetalol, sulfur hexafluoride microspheres, sodium chloride flush, sodium chloride, ondansetron **OR** ondansetron, polyethylene glycol, acetaminophen **OR** acetaminophen  I/O last 3 completed shifts:  In: 1664.2 [P.O.:1372; I.V.:292.2]  Out: 5150 [Urine:5150]  No intake/output data recorded.    Intake/Output Summary (Last 24 hours) at 5/20/2025 0702  Last data filed at 5/20/2025 0526  Gross per 24 hour   Intake 1544.19 ml   Output 3800 ml   Net -2255.81 ml       CBC:   Recent Labs     05/18/25  0730 05/19/25  0540   WBC 3.2* 3.5*   HGB 9.1* 9.3*    276       BMP:    Recent Labs     05/18/25  1808 05/19/25  0540 05/20/25  0524    144 145   K 3.9 3.8 3.6   * 112* 110   CO2 22 22 23   BUN 21* 21* 23*   CREATININE 3.0* 3.0* 3.0*   GLUCOSE 181* 145* 168*   CALCIUM 7.7* 7.8* 7.7*         Objective:   Vitals: BP (!) 175/79   Pulse 77   Temp 97.9 °F (36.6 °C) (Oral)   Resp 18   Ht 1.651 m (5' 5\")   Wt 101.8 kg (224 lb 6.9 oz)   SpO2 97%   BMI 37.35 kg/m²   General appearance: alert and cooperative with exam, in no acute distress  HEENT:  normocephalic, atraumatic,   Neck: supple, trachea midline  Lungs:  breathing comfortably on room air  Heart:: regular rate and rhythm,   Extremities: edematous  Neurologic: alert, oriented, follows commands, interactive    MEDICAL DECISION MAKING     -Hypervolemia  -Hypokalemia  -MARCO  -CKD4  -HTN  -Proteinuria: Known to have proteinuria, likely from diabetic nephropathy  -Hypocalcemia     Suggest;  -Cr stable at 3  -Will plan few days of UF with the dialysis machine, to help with the edema  -Obtain PTH and vitamin D levels  -HCTZ no longer needed  -BMP daily please  -Measure UOP in mL please. Pt is on lasix gtt and in fluid overload, UOP monitoring is important  -Limit oral fluids to 1.5L  -If IV contrast needed then discuss risks and benefits with pt before proceeding    Thank you                  Electronically signed by Fili Colon DO on 5/20/2025 at 7:02 AM

## 2025-05-20 NOTE — CONSULTS
Consult Interventional Radiology    Date:2025  Name:Angela Pabon   :1965   MR#:[id number]    SEX:female     Planned procedure:  temp cath  Indication: MARCO, CKD    H&P Status: H&P was reviewed, the patient was examined and no change has occurred in patient's condition since H&P was completed.    Past Medical History:  Past Medical History:   Diagnosis Date    Acute hypoxemic respiratory failure (HCC)     Essential hypertension     Human metapneumovirus (hMPV) pneumonia     Hypertension     Obesity     Sepsis (HCC)     Type 2 diabetes mellitus without complication (HCC)         Past Surgical History:  Past Surgical History:   Procedure Laterality Date    BREAST BIOPSY      COLONOSCOPY      WISDOM TOOTH EXTRACTION         Social History:  Social History     Socioeconomic History    Marital status: Single     Spouse name: Not on file    Number of children: Not on file    Years of education: Not on file    Highest education level: Not on file   Occupational History    Not on file   Tobacco Use    Smoking status: Never    Smokeless tobacco: Never   Substance and Sexual Activity    Alcohol use: No    Drug use: Not on file    Sexual activity: Not on file   Other Topics Concern    Not on file   Social History Narrative    Not on file     Social Drivers of Health     Financial Resource Strain: Low Risk  (2024)    Overall Financial Resource Strain (CARDIA)     Difficulty of Paying Living Expenses: Not hard at all   Food Insecurity: No Food Insecurity (2025)    Hunger Vital Sign     Worried About Running Out of Food in the Last Year: Never true     Ran Out of Food in the Last Year: Never true   Transportation Needs: No Transportation Needs (2025)    PRAPARE - Transportation     Lack of Transportation (Medical): No     Lack of Transportation (Non-Medical): No   Physical Activity: Not on file   Stress: Not on file   Social Connections: Not on file   Intimate Partner Violence: Not on file   Housing  of what is noted above.     Vital Signs:  Vitals:    05/20/25 1005 05/20/25 1245 05/20/25 1320 05/20/25 1340   BP:  (!) 190/85 (!) 186/93 (!) 194/96   Pulse:  (!) 108 74 75   Resp:       Temp:       TempSrc:       SpO2: 95%      Weight:       Height:            Laboratory:  Recent Labs     05/18/25  0730 05/18/25  1808 05/19/25  0540 05/20/25  0524   WBC 3.2*  --  3.5*  --     145 144 145   * 112* 112* 110   CO2 21 22 22 23   BUN 20 21* 21* 23*   CREATININE 2.9* 3.0* 3.0* 3.0*   GLUCOSE 117* 181* 145* 168*       Medications reviewed: No contraindications for coagulation or sedation plans    IMAGING DATA   None available for review    ASSESSMENT AND PLAN     Pt is a good candidate for temp cath    Procedure, RBA explained to patient and available family. Informed consent obtained    Electronically signed by Mago Travis MD on 5/20/2025 at 1:47 PM

## 2025-05-20 NOTE — CARE COORDINATION
Chart reviewed. Patient scheduled to have temp cath placed with IR. No new needs at this time. CM following.

## 2025-05-20 NOTE — BRIEF OP NOTE
Department of Interventional Radiology Post-Procedure Note      Date: 5/20/2025     Interventional Radiologist: Thaddeus    Procedure Performed:  temp cath    H&P Status: H&P was reviewed, the patient was examined and no change has occurred in patient's condition since H&P was completed.    Pre-procedure Diagnosis:  MARCO, CKD    Post-procedure Diagnosis:  Same    Sedation:  none    Contrast used:  none    Estimated blood loss:  Minimal    Preliminary Findings:  Successful RCFV access, Unable to pass wire through RIJV    Complications:  none    Full Report to Follow.    Electronically signed by Mago Travis MD on 5/20/2025 at 1:49 PM

## 2025-05-20 NOTE — CARE COORDINATION
This RN CM to speak with patient as she has concerns about not being able to work after discharge. This RN CM discussed disability and contacting [employer] with patient. Patient states work will give her leave but she is newer there and she is concerned that they will not be able to facilitate her needs. This RN CM has added resources to S for contacts for community resources. CM will continue to follow.

## 2025-05-21 LAB
25(OH)D3 SERPL-MCNC: <6 NG/ML (ref 30–150)
ANION GAP SERPL CALCULATED.3IONS-SCNC: 13 MMOL/L (ref 9–17)
BUN SERPL-MCNC: 24 MG/DL (ref 7–20)
CALCIUM SERPL-MCNC: 7.8 MG/DL (ref 8.3–10.6)
CHLORIDE SERPL-SCNC: 107 MMOL/L (ref 99–110)
CO2 SERPL-SCNC: 22 MMOL/L (ref 21–32)
CREAT SERPL-MCNC: 2.8 MG/DL (ref 0.6–1.1)
GFR, ESTIMATED: 17 ML/MIN/1.73M2
GLUCOSE SERPL-MCNC: 160 MG/DL (ref 74–99)
HBV CORE AB SER QL: NONREACTIVE
HBV SURFACE AB SERPL IA-ACNC: 243 MIU/ML
HBV SURFACE AG SERPL QL IA: NONREACTIVE
POTASSIUM SERPL-SCNC: 3.3 MMOL/L (ref 3.5–5.1)
PTH-INTACT SERPL-MCNC: 258 PG/ML (ref 14–72)
SODIUM SERPL-SCNC: 142 MMOL/L (ref 136–145)

## 2025-05-21 PROCEDURE — 6370000000 HC RX 637 (ALT 250 FOR IP): Performed by: INTERNAL MEDICINE

## 2025-05-21 PROCEDURE — 94761 N-INVAS EAR/PLS OXIMETRY MLT: CPT

## 2025-05-21 PROCEDURE — 82306 VITAMIN D 25 HYDROXY: CPT

## 2025-05-21 PROCEDURE — 6360000002 HC RX W HCPCS: Performed by: INTERNAL MEDICINE

## 2025-05-21 PROCEDURE — 83970 ASSAY OF PARATHORMONE: CPT

## 2025-05-21 PROCEDURE — 87340 HEPATITIS B SURFACE AG IA: CPT

## 2025-05-21 PROCEDURE — 6370000000 HC RX 637 (ALT 250 FOR IP)

## 2025-05-21 PROCEDURE — 86704 HEP B CORE ANTIBODY TOTAL: CPT

## 2025-05-21 PROCEDURE — 80048 BASIC METABOLIC PNL TOTAL CA: CPT

## 2025-05-21 PROCEDURE — 86317 IMMUNOASSAY INFECTIOUS AGENT: CPT

## 2025-05-21 PROCEDURE — 1200000000 HC SEMI PRIVATE

## 2025-05-21 PROCEDURE — 2580000003 HC RX 258: Performed by: INTERNAL MEDICINE

## 2025-05-21 RX ORDER — CALCITRIOL 0.25 UG/1
0.25 CAPSULE, LIQUID FILLED ORAL DAILY
Status: DISCONTINUED | OUTPATIENT
Start: 2025-05-21 | End: 2025-05-24 | Stop reason: HOSPADM

## 2025-05-21 RX ORDER — POTASSIUM CHLORIDE 1500 MG/1
40 TABLET, EXTENDED RELEASE ORAL ONCE
Status: COMPLETED | OUTPATIENT
Start: 2025-05-21 | End: 2025-05-21

## 2025-05-21 RX ORDER — ERGOCALCIFEROL 1.25 MG/1
50000 CAPSULE, LIQUID FILLED ORAL WEEKLY
Status: DISCONTINUED | OUTPATIENT
Start: 2025-05-21 | End: 2025-05-24 | Stop reason: HOSPADM

## 2025-05-21 RX ADMIN — EMPAGLIFLOZIN 10 MG: 10 TABLET, FILM COATED ORAL at 10:11

## 2025-05-21 RX ADMIN — CALCITRIOL CAPSULES 0.25 MCG 0.25 MCG: 0.25 CAPSULE ORAL at 10:15

## 2025-05-21 RX ADMIN — HYDRALAZINE HYDROCHLORIDE 100 MG: 50 TABLET ORAL at 10:11

## 2025-05-21 RX ADMIN — LEVOTHYROXINE SODIUM 25 MCG: 0.03 TABLET ORAL at 05:15

## 2025-05-21 RX ADMIN — CLONIDINE HYDROCHLORIDE 0.1 MG: 0.1 TABLET ORAL at 10:12

## 2025-05-21 RX ADMIN — ENOXAPARIN SODIUM 30 MG: 100 INJECTION SUBCUTANEOUS at 10:12

## 2025-05-21 RX ADMIN — HYDRALAZINE HYDROCHLORIDE 100 MG: 50 TABLET ORAL at 22:14

## 2025-05-21 RX ADMIN — LABETALOL HYDROCHLORIDE 10 MG: 5 INJECTION, SOLUTION INTRAVENOUS at 02:51

## 2025-05-21 RX ADMIN — ERGOCALCIFEROL 50000 UNITS: 1.25 CAPSULE ORAL at 10:12

## 2025-05-21 RX ADMIN — ATORVASTATIN CALCIUM 80 MG: 40 TABLET, FILM COATED ORAL at 22:14

## 2025-05-21 RX ADMIN — POTASSIUM CHLORIDE 40 MEQ: 1500 TABLET, EXTENDED RELEASE ORAL at 05:15

## 2025-05-21 RX ADMIN — FUROSEMIDE 5 MG/HR: 10 INJECTION, SOLUTION INTRAMUSCULAR; INTRAVENOUS at 19:04

## 2025-05-21 ASSESSMENT — PAIN SCALES - GENERAL: PAINLEVEL_OUTOF10: 0

## 2025-05-21 NOTE — PROGRESS NOTES
Nephrology Progress Note        [address] [place] [address]  Phone: [phone]  Office Hours: 8:30AM - 4:30PM  Monday - Friday 5/21/2025 9:16 AM  Subjective:   Admit Date: 5/16/2025  PCP: Willa Ravi MD  Interval History:   Femoral temp hd cath in place  Tolerated UF yesterday, with HD machine  Good UOP  Leg swelling is improving  Diet: ADULT DIET; Regular; No Added Salt (3-4 gm); 1500 ml      Data:   Scheduled Meds:   vitamin D  50,000 Units Oral Weekly    calcitRIOL  0.25 mcg Oral Daily    potassium chloride  10 mEq IntraVENous Once    atorvastatin  80 mg Oral Nightly    cloNIDine  0.1 mg Oral Daily    empagliflozin  10 mg Oral Daily    hydrALAZINE  100 mg Oral BID    levothyroxine  25 mcg Oral QAM AC    sodium chloride flush  5-40 mL IntraVENous 2 times per day    enoxaparin  30 mg SubCUTAneous Daily     Continuous Infusions:   furosemide (LASIX) 100 mg in sodium chloride 0.9 % 100 mL infusion 5 mg/hr (05/19/25 1640)    sodium chloride       PRN Meds:hydrALAZINE, labetalol, sulfur hexafluoride microspheres, sodium chloride flush, sodium chloride, ondansetron **OR** ondansetron, polyethylene glycol, acetaminophen **OR** acetaminophen  I/O last 3 completed shifts:  In: 950 [P.O.:450]  Out: 8720 [Urine:6220]  I/O this shift:  In: 120 [P.O.:120]  Out: 600 [Urine:600]    Intake/Output Summary (Last 24 hours) at 5/21/2025 0916  Last data filed at 5/21/2025 0818  Gross per 24 hour   Intake 620 ml   Output 6250 ml   Net -5630 ml       CBC:   Recent Labs     05/19/25  0540   WBC 3.5*   HGB 9.3*          BMP:    Recent Labs     05/19/25  0540 05/20/25  0524 05/21/25  0000    145 142   K 3.8 3.6 3.3*   * 110 107   CO2 22 23 22   BUN 21* 23* 24*   CREATININE 3.0* 3.0* 2.8*   GLUCOSE 145* 168* 160*   CALCIUM 7.8* 7.7* 7.8*         Objective:   Vitals: BP (!) 175/69   Pulse 73   Temp 98.6 °F (37 °C) (Oral)   Resp (!) 4   Ht 1.651 m (5' 5\")   Wt 95.8 kg (211 lb 3.2 oz)    SpO2 95%   BMI 35.15 kg/m²   General appearance: alert and cooperative with exam, in no acute distress  HEENT: normocephalic, atraumatic,   Neck: supple, trachea midline  Lungs: breathing comfortably on room air  Abdomen:  non distended,   Extremities: edema much better  Neurologic: alert, oriented, follows commands, interactive    MEDICAL DECISION MAKING     -Hypervolemia  -Hypokalemia  -MARCO  -CKD4  -HTN  -Proteinuria: Known to have proteinuria, likely from diabetic nephropathy  -Hypocalcemia  -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  She has secondary HPTHism  -HCTZ no longer needed  -BMP daily please  -Measure UOP in mL please. Pt is on lasix gtt and in fluid overload, UOP monitoring is important  -Limit oral fluids to 1.5L  -If IV contrast needed then discuss risks and benefits with pt before proceeding    Thank you              Electronically signed by Fili Colon DO on 5/21/2025 at 9:16 AM

## 2025-05-21 NOTE — PROGRESS NOTES
V2.0    [facility] Progress Note      Name:  Angela Pabon /Age/Sex: 1965  (60 y.o. female)   MRN & CSN:  [id number] & [id number] Encounter Date/Time: 2025 8:04 AM EDT   Location:  [address] [place] [address] PCP: Willa Ravi MD     Attending:Juany Rios MD       Hospital Day: 5    Assessment and Recommendations   Angela Pabon is a 60 y.o. female  who presents with Anasarca      Plan:    MARCO on CKD stage IV with uptrending creatinine leading to anasarca  Presented with dyspnea on exertion  Recently started on IV Lasix 40 mg then Lasix drip eventually decided for inpatient dialysis nephrology was on board  Consult placed for internal jugular catheter  Fluid restriction to 1.5 L     Essential hypertension, accelerated hypertension  -continue clonidine 0.1 Mg daily, hydrochlorothiazide 12.5 Mg daily, hydralazine 100 Mg twice daily, as needed IV hydralazine and labetalol     Anemia of chronic disease  Hgb 8.7 on admission. Hgb 9.1  -Monitor H& H         Diet ADULT DIET; Regular; No Added Salt (3-4 gm); 1500 ml   DVT Prophylaxis [] Lovenox, [x]  Heparin, [] SCDs, [] Ambulation,  [] Eliquis, [] Xarelto  [] Coumadin   Code Status Full Code   Disposition From: Home  Expected Disposition: Home  Estimated Date of Discharge: 1-2 days  Patient requires continued admission due to CHF   Surrogate Decision Maker/ POA       Personally reviewed Lab Studies and Imaging     Discussed management of the case with nephrology recommended inpatient dialysis      Drugs that require monitoring for toxicity include Lasix  and the method of monitoring was creat and electrolytes        Subjective:     Chief Complaint: \" I am still swollen.\"     Angela Pabon is a 60 y.o. female who presents with CHF.  Patient seen at bedside agreeable to internal jugular catheter and then inpatient dialysis discussed in detail that this does not mean the patient is on permanent dialysis.  Patient is not an uric continue Lasix.  Patient is a physical

## 2025-05-21 NOTE — PROGRESS NOTES
V2.0    [facility] Progress Note      Name:  Angela Pabon /Age/Sex: 1965  (60 y.o. female)   MRN & CSN:  [id number] & [id number] Encounter Date/Time: 2025 8:04 AM EDT   Location:  [address] [place], [place] [address]- PCP: Willa Ravi MD     Attending:Juany Rios MD       Hospital Day: 6    Assessment and Recommendations   Angela Pabon is a 60 y.o. female  who presents with Anasarca      Plan:    MARCO on CKD stage IV with uptrending creatinine leading to anasarca  Presented with dyspnea on exertion  Recently started on IV Lasix 40 mg then Lasix drip eventually decided for inpatient dialysis nephrology was on board  Consult placed for internal jugular catheter  Fluid restriction to 1.5 L  Ultrafiltration of 2 L today.  Plan for dialysis daily except      Essential hypertension, accelerated hypertension  -continue clonidine 0.1 Mg daily, hydrochlorothiazide 12.5 Mg daily, hydralazine 100 Mg twice daily, as needed IV hydralazine and labetalol     Anemia of chronic disease  Hgb 8.7 on admission. Hgb 9.1  -Monitor H& H         Diet ADULT DIET; Regular; No Added Salt (3-4 gm); 1500 ml   DVT Prophylaxis [] Lovenox, [x]  Heparin, [] SCDs, [] Ambulation,  [] Eliquis, [] Xarelto  [] Coumadin   Code Status Full Code   Disposition From: Home  Expected Disposition: Home  Estimated Date of Discharge: 1-2 days  Patient requires continued admission due to CHF   Surrogate Decision Maker/ POA       Personally reviewed Lab Studies and Imaging     Discussed management of the case with nephrology recommended inpatient dialysis daily except       Drugs that require monitoring for toxicity include Lasix  and the method of monitoring was creat and electrolytes        Subjective:     Chief Complaint: \" I am still swollen.\"     Angela Pabon is a 60 y.o. female who presents with CHF.      Patient seen at bedside in dialysis was having ultrafiltration dialysis of 2 L no active complaints at this time vitally stable    Review of

## 2025-05-21 NOTE — PLAN OF CARE
Problem: Chronic Conditions and Co-morbidities  Goal: Patient's chronic conditions and co-morbidity symptoms are monitored and maintained or improved  Outcome: Progressing     Problem: Discharge Planning  Goal: Discharge to home or other facility with appropriate resources  Outcome: Progressing     Problem: Pain  Goal: Verbalizes/displays adequate comfort level or baseline comfort level  Outcome: Progressing     Problem: Safety - Adult  Goal: Free from fall injury  Outcome: Progressing     Problem: Neurosensory - Adult  Goal: Achieves stable or improved neurological status  Outcome: Progressing  Goal: Achieves maximal functionality and self care  Outcome: Progressing     Problem: Respiratory - Adult  Goal: Achieves optimal ventilation and oxygenation  Outcome: Progressing     Problem: Cardiovascular - Adult  Goal: Maintains optimal cardiac output and hemodynamic stability  Outcome: Progressing     Problem: Skin/Tissue Integrity - Adult  Goal: Skin integrity remains intact  Outcome: Progressing  Goal: Incisions, wounds, or drain sites healing without S/S of infection  Outcome: Progressing  Goal: Oral mucous membranes remain intact  Outcome: Progressing     Problem: Musculoskeletal - Adult  Goal: Return mobility to safest level of function  Outcome: Progressing  Goal: Return ADL status to a safe level of function  Outcome: Progressing     Problem: Gastrointestinal - Adult  Goal: Minimal or absence of nausea and vomiting  Outcome: Progressing  Goal: Maintains adequate nutritional intake  Outcome: Progressing     Problem: Metabolic/Fluid and Electrolytes - Adult  Goal: Electrolytes maintained within normal limits  Outcome: Progressing  Goal: Hemodynamic stability and optimal renal function maintained  Outcome: Progressing  Goal: Glucose maintained within prescribed range  Outcome: Progressing

## 2025-05-21 NOTE — PROGRESS NOTES
Pt tolerated 3hr tx well. UF only, 2L removed. CVC accessed for tx without issue. BP elevated at end of tx 192/76. PCN made aware as she has prn bp meds ordered. Blood returned without incident.      Patient Name: Angela Pabon  Patient : 1965  MRN: [id number]     Acct: [id number]  Date of Admission: 2025  Room/Bed: 1106/[facility]-A  Code Status:  Full Code  Allergies:   Allergies   Allergen Reactions    Nifedipine Shortness Of Breath    Penicillins Anaphylaxis    Amlodipine      Leg swelling    Metformin Other (See Comments)     Other reaction(s): Other - comment required  Kidney function affected  Kidney function affected       Diagnosis:    Patient Active Problem List   Diagnosis    ILD (interstitial lung disease) ([facility])    Obesity (BMI 30-39.9)    SOB (shortness of breath)    Pulmonary hypertension ([facility])    Stage 3b chronic kidney disease ([facility])    Secondary hypertension    Persistent proteinuria    Type 2 diabetes mellitus with stage 3b chronic kidney disease, without long-term current use of insulin ([facility])    Nephrotic syndrome    CKD (chronic kidney disease) stage 4, GFR 15-29 ml/min ([facility])    Primary hypertension    Dyslipidemia    Bilateral leg edema    Anasarca         Treatment:  Hemodilaysis 2:1  Priority: Routine  Location: Acute Room    Diabetic: Yes  NPO: No  Isolation Precautions: Dialysis     Consent for Treatment Verified: Yes  Blood Consent Verified: Not Applicable     Safety Verified: Identify (I), Consent (C), Equipment (E), HepB Status (B), Orders Complete (O), Access Verified (A), and Timeliness (T)  Time out performed prior to access at 1125 hours.    Report Received from Primary RN at 1100 hours.  Primary RN (First Initial, Last Name, Title): LUANNE Alberto LPN  Incapacitated Nurse Education Completed: Yes     HBsAg ONLY:  Date Drawn: May 21, 2025       Results: Negative  HBsAb:  Date Drawn:  May 21, 2025       Results: Immune >10    Order  Dialysis Bath  K+ (Potassium):  (uf only)  Ca+  (Calcium):  (uf only)  Na+ (Sodium): uf only  HCO3 (Bicarb): uf only  Bicarbonate Concentrate Lot No.: uf only  Acid Concentrate Lot No.: uf only     Na+ Modeling: Not Applicable  Dialyzer: F180  Dialysate Temperature (C):  36  Blood Flow Rate (BFR):  300   Dialysate Flow Rate (DFR):    UF only         Access to be Utilized   Access: Non-tunneled Catheter  Location: Femoral  Side: Right   Needle gauge:  Not Applicable  + Bruit/Thrill: Not Applicable    First Use X-ray Verified: Yes  OK to use line order: Yes    Site Assessment:  Signs and Symptoms of Infection/Inflammation: None  If yes: Not Applicable  Dressing: Dry and Intact  Site Prep: Medical Aseptic Technique  Dressing Changed this Treatment: No  If yes, by whom: NA - not changed today  Date of Last Dressing Change: 5/20/25  Antimicrobial Patch in place?: Yes  Red Alcohol Caps in place?: Yes  Gauze Dressing?: No  Non Dialysis Use?: No  Comment:    Flows: Good, Patent  If access problem, who was notified:     Pre and Post-Assessment  Patient Vitals for the past 8 hrs:   Level of Consciousness Oriented X Heart Rhythm Respiratory Quality/Effort O2 Device Bilateral Breath Sounds Skin Color Skin Condition/Temp Abdomen Inspection Bowel Sounds (All Quadrants) Edema Edema Generalized RUE Edema LUE Edema RLE Edema LLE Edema Pain Level   05/21/25 1000 -- -- -- -- None (Room air) -- -- -- -- -- -- -- -- -- -- -- 0   05/21/25 1125 0 4 Regular Unlabored None (Room air) Diminished Ashen Dry;Warm Rounded;Obese Active Generalized Non-pitting Anasarca;Pitting;+2 Anasarca;Pitting;+2 Anasarca;Pitting;+3 Anasarca;Pitting;+2 --   05/21/25 1137 0 -- -- Unlabored -- -- Ashen Warm;Dry Rounded;Obese -- Generalized Non-pitting -- -- -- -- --   05/21/25 1437 0 4 Regular Unlabored None (Room air) Diminished Ashen Dry;Warm Rounded;Obese Active Generalized Non-pitting Anasarca;Pitting;+2 Anasarca;Pitting;+2 Anasarca;Pitting;+3 Anasarca;Pitting;+2 --     Labs  Recent Labs     05/19/25  0523

## 2025-05-21 NOTE — PROGRESS NOTES
Physician Progress Note      PATIENT:               KERRI LEVIN  CSN #:                  [id number]  :                       1965  ADMIT DATE:       2025 10:12 AM  DISCH DATE:  RESPONDING  PROVIDER #:        Juany Rios MD          QUERY TEXT:    Dear Dr. Rios, and/or Corinne Delarosa, APRN-CNP,  Pt admitted with Anasarca, dyspnea and has CKD4. Congestive Heart Failure is   documented in the medical record - by PCP as \"Likely combination of   CKD and CHF and her being off of her diuretics\".  Please document the type and   acuity:    The clinical indicators include:  Hx HTN, CKD   ED for SOB, 50 lb wt gain, elevated Pro-BNP=20,149, creat=2.9  ED notes \"Pitting edema consistent with anasarca taking breast every 3 words   due to shortness of breath.\"  out of her HCTZ and Lasix since March,  ED notes \"Concern for acute CHF exacerbation\"  H and P notes \"Likely combination of CKD and CHF and her being off of her   diuretics\"  treated with IV Lasix, Lasix gtt, I and O, Echo, monitor labs, fluid   restrictions  Echo shows EF=55-60% and Grade II diastolic dysfunction.  Options provided:  -- Acute Diastolic CHF/HFpEF  -- Acute on Chronic Diastolic CHF/HFpEF  -- Other - I will add my own diagnosis  -- Disagree - Not applicable / Not valid  -- Disagree - Clinically unable to determine / Unknown  -- Refer to Clinical Documentation Reviewer    PROVIDER RESPONSE TEXT:    This patient is in acute diastolic CHF/HFpEF.    Query created by: Eloise Reid on 2025 7:20 AM      Electronically signed by:  Juany Rios MD 2025 9:47 AM

## 2025-05-22 LAB
ALBUMIN SERPL-MCNC: 2.6 G/DL (ref 3.4–5)
ALBUMIN/GLOB SERPL: 1.2 {RATIO} (ref 1.1–2.2)
ALP SERPL-CCNC: 111 U/L (ref 40–129)
ALT SERPL-CCNC: 24 U/L (ref 10–40)
ANION GAP SERPL CALCULATED.3IONS-SCNC: 12 MMOL/L (ref 9–17)
AST SERPL-CCNC: 25 U/L (ref 15–37)
BILIRUB SERPL-MCNC: 1 MG/DL (ref 0–1)
BUN SERPL-MCNC: 23 MG/DL (ref 7–20)
CALCIUM SERPL-MCNC: 7.8 MG/DL (ref 8.3–10.6)
CHLORIDE SERPL-SCNC: 108 MMOL/L (ref 99–110)
CO2 SERPL-SCNC: 24 MMOL/L (ref 21–32)
CREAT SERPL-MCNC: 2.9 MG/DL (ref 0.6–1.1)
GFR, ESTIMATED: 17 ML/MIN/1.73M2
GLUCOSE SERPL-MCNC: 108 MG/DL (ref 74–99)
POTASSIUM SERPL-SCNC: 3.6 MMOL/L (ref 3.5–5.1)
PROT SERPL-MCNC: 4.9 G/DL (ref 6.4–8.2)
SODIUM SERPL-SCNC: 144 MMOL/L (ref 136–145)

## 2025-05-22 PROCEDURE — 5A1D70Z PERFORMANCE OF URINARY FILTRATION, INTERMITTENT, LESS THAN 6 HOURS PER DAY: ICD-10-PCS | Performed by: STUDENT IN AN ORGANIZED HEALTH CARE EDUCATION/TRAINING PROGRAM

## 2025-05-22 PROCEDURE — 2580000003 HC RX 258: Performed by: INTERNAL MEDICINE

## 2025-05-22 PROCEDURE — 80053 COMPREHEN METABOLIC PANEL: CPT

## 2025-05-22 PROCEDURE — 1200000000 HC SEMI PRIVATE

## 2025-05-22 PROCEDURE — 6370000000 HC RX 637 (ALT 250 FOR IP): Performed by: INTERNAL MEDICINE

## 2025-05-22 PROCEDURE — 6360000002 HC RX W HCPCS: Performed by: INTERNAL MEDICINE

## 2025-05-22 PROCEDURE — 94761 N-INVAS EAR/PLS OXIMETRY MLT: CPT

## 2025-05-22 PROCEDURE — 90935 HEMODIALYSIS ONE EVALUATION: CPT

## 2025-05-22 RX ORDER — POTASSIUM CHLORIDE 1500 MG/1
20 TABLET, EXTENDED RELEASE ORAL 2 TIMES DAILY WITH MEALS
Status: DISCONTINUED | OUTPATIENT
Start: 2025-05-22 | End: 2025-05-24 | Stop reason: HOSPADM

## 2025-05-22 RX ORDER — ISOSORBIDE MONONITRATE 60 MG/1
60 TABLET, EXTENDED RELEASE ORAL ONCE
Status: COMPLETED | OUTPATIENT
Start: 2025-05-22 | End: 2025-05-22

## 2025-05-22 RX ORDER — BUMETANIDE 1 MG/1
2 TABLET ORAL 2 TIMES DAILY
Status: DISCONTINUED | OUTPATIENT
Start: 2025-05-23 | End: 2025-05-23

## 2025-05-22 RX ORDER — ISOSORBIDE MONONITRATE 30 MG/1
30 TABLET, EXTENDED RELEASE ORAL DAILY
Status: DISCONTINUED | OUTPATIENT
Start: 2025-05-22 | End: 2025-05-22

## 2025-05-22 RX ORDER — ISOSORBIDE MONONITRATE 60 MG/1
120 TABLET, EXTENDED RELEASE ORAL DAILY
Status: DISCONTINUED | OUTPATIENT
Start: 2025-05-23 | End: 2025-05-23

## 2025-05-22 RX ADMIN — CLONIDINE HYDROCHLORIDE 0.1 MG: 0.1 TABLET ORAL at 11:51

## 2025-05-22 RX ADMIN — POTASSIUM CHLORIDE 20 MEQ: 1500 TABLET, EXTENDED RELEASE ORAL at 11:51

## 2025-05-22 RX ADMIN — ISOSORBIDE MONONITRATE 60 MG: 60 TABLET, EXTENDED RELEASE ORAL at 18:36

## 2025-05-22 RX ADMIN — POTASSIUM CHLORIDE 20 MEQ: 1500 TABLET, EXTENDED RELEASE ORAL at 16:38

## 2025-05-22 RX ADMIN — FUROSEMIDE 5 MG/HR: 10 INJECTION, SOLUTION INTRAMUSCULAR; INTRAVENOUS at 16:31

## 2025-05-22 RX ADMIN — LABETALOL HYDROCHLORIDE 10 MG: 5 INJECTION, SOLUTION INTRAVENOUS at 16:39

## 2025-05-22 RX ADMIN — LEVOTHYROXINE SODIUM 25 MCG: 0.03 TABLET ORAL at 06:17

## 2025-05-22 RX ADMIN — HYDRALAZINE HYDROCHLORIDE 100 MG: 50 TABLET ORAL at 11:50

## 2025-05-22 RX ADMIN — ATORVASTATIN CALCIUM 80 MG: 40 TABLET, FILM COATED ORAL at 21:29

## 2025-05-22 RX ADMIN — EMPAGLIFLOZIN 10 MG: 10 TABLET, FILM COATED ORAL at 11:51

## 2025-05-22 RX ADMIN — ISOSORBIDE MONONITRATE 30 MG: 30 TABLET, EXTENDED RELEASE ORAL at 11:50

## 2025-05-22 RX ADMIN — ENOXAPARIN SODIUM 30 MG: 100 INJECTION SUBCUTANEOUS at 11:51

## 2025-05-22 RX ADMIN — HYDRALAZINE HYDROCHLORIDE 100 MG: 50 TABLET ORAL at 21:29

## 2025-05-22 RX ADMIN — CALCITRIOL CAPSULES 0.25 MCG 0.25 MCG: 0.25 CAPSULE ORAL at 11:50

## 2025-05-22 ASSESSMENT — PAIN SCALES - GENERAL: PAINLEVEL_OUTOF10: 0

## 2025-05-22 NOTE — PROGRESS NOTES
05/22/25 1241   Encounter Summary   Encounter Overview/Reason Follow-up   Encounter Code  Assessment by  services   Service Provided For Patient   Referral/Consult From Rounding   Support System Family members   Last Encounter  05/22/25  (Visit by volunteer  Tate, charted by Chaplain Hager - Pt out for testing)   Complexity of Encounter Low   Spiritual/Emotional needs   Type Spiritual Support   Assessment/Intervention/Outcome   Assessment Calm   Intervention Sustaining Presence/Ministry of presence   Outcome Coping   Plan and Referrals   Plan/Referrals Continue Support (comment)

## 2025-05-22 NOTE — PROGRESS NOTES
V2.0    [facility] Progress Note      Name:  Angela Pabon /Age/Sex: 1965  (60 y.o. female)   MRN & CSN:  [id number] & [id number] Encounter Date/Time: 2025 8:04 AM EDT   Location:  [address] [place], [place] [address] PCP: Willa Ravi MD     Attending:Juany Rios MD       Hospital Day: 7    Assessment and Recommendations   Angela Pabon is a 60 y.o. female  who presents with Anasarca      Plan:    MARCO on CKD stage IV with uptrending creatinine leading to anasarca  Presented with dyspnea on exertion  Recently started on IV Lasix 40 mg then Lasix drip eventually decided for inpatient dialysis nephrology was on board  Fluid restriction to 1.5 L  Ultrafiltration of 2 L yesterday, 1.5L today  Continue sessions everyday except        Essential hypertension, accelerated hypertension  -continue clonidine 0.1 Mg daily, hydrochlorothiazide 12.5 Mg daily, hydralazine 100 Mg twice daily, as needed IV hydralazine and labetalol     Anemia of chronic disease  Hgb 8.7 on admission.   -Monitor H& H         Diet ADULT DIET; Regular; No Added Salt (3-4 gm); 1500 ml   DVT Prophylaxis [] Lovenox, [x]  Heparin, [] SCDs, [] Ambulation,  [] Eliquis, [] Xarelto  [] Coumadin   Code Status Full Code   Disposition From: Home  Expected Disposition: Home  Estimated Date of Discharge: 1-2 days  Patient requires continued admission due to CHF   Surrogate Decision Maker/ POA       Personally reviewed Lab Studies and Imaging     Discussed management of the case with nephrology recommended inpatient dialysis daily except       Drugs that require monitoring for toxicity include Lasix  and the method of monitoring was creat and electrolytes        Subjective:     Chief Complaint: \" I am still swollen.\"     Angela Pabon is a 60 y.o. female who presents with CHF.      Patient seen in dialysis, right upper extremity swelling, continues to be worried if this is permeant, no other complaints at this time    Review of Systems:      Pertinent  hours Findings: There is some groundglass increased density in both lung bases greater on the right. Differentials would include developing infiltrate or congestion. These findings are similar to those seen on CT study of 10/20/2022. Heart is upper limits of normal in size. Upper lung zones are clear. IMPRESSION: Groundglass infiltrative changes in both lung bases greater on the right.   Dictated and Electronically Signed By: Lucas Baeza [facility] 5/16/2025 11:24          CBC:   No results for input(s): \"WBC\", \"HGB\", \"PLT\" in the last 72 hours.    BMP:    Recent Labs     05/20/25  0524 05/21/25  0000 05/22/25  0607    142 144   K 3.6 3.3* 3.6    107 108   CO2 23 22 24   BUN 23* 24* 23*   CREATININE 3.0* 2.8* 2.9*   GLUCOSE 168* 160* 108*     Hepatic:   Recent Labs     05/22/25  0607   AST 25   ALT 24   BILITOT 1.0   ALKPHOS 111       Lipids:   Lab Results   Component Value Date/Time    CHOL 187 05/06/2025 10:09 AM    HDL 63 05/06/2025 10:09 AM    TRIG 81 05/06/2025 10:09 AM     Hemoglobin A1C:   Lab Results   Component Value Date/Time    LABA1C 5.0 05/06/2025 09:49 AM     TSH:   Lab Results   Component Value Date/Time    TSH 5.73 05/06/2025 10:09 AM     Troponin: No results found for: \"TROPONINT\"  Lactic Acid: No results for input(s): \"LACTA\" in the last 72 hours.  BNP:   No results for input(s): \"PROBNP\" in the last 72 hours.    UA:  Lab Results   Component Value Date/Time    NITRU NEGATIVE 05/16/2025 05:42 PM    COLORU Yellow 05/16/2025 05:42 PM    PHUR 6.0 05/16/2025 05:42 PM    LABCAST None seen 07/12/2024 08:30 AM    WBCUA 4 05/16/2025 05:42 PM    RBCUA 3 05/16/2025 05:42 PM    MUCUS RARE 05/16/2025 05:42 PM    BACTERIA None seen 07/12/2024 08:30 AM    CLARITYU Clear 07/12/2024 08:30 AM    LEUKOCYTESUR NEGATIVE 05/16/2025 05:42 PM    UROBILINOGEN 0.2 05/16/2025 05:42 PM    BILIRUBINUR NEGATIVE 05/16/2025 05:42 PM    BLOODU Negative 07/12/2024 08:30 AM    GLUCOSEU 100  05/16/2025 05:42 PM    KETUA NEGATIVE 05/16/2025 05:42 PM     Urine Cultures: No results found for: \"LABURIN\"  Blood Cultures: No results found for: \"BC\"  No results found for: \"BLOODCULT2\"  Organism: No results found for: \"ORG\"      Electronically signed by Juany Rios MD on 5/22/2025

## 2025-05-22 NOTE — DIALYSIS
Patient Name: Angela Pabon  Patient : 1965  MRN: [id number]     Acct: [id number]  Date of Admission: 2025  Room/Bed: [facility]6/[facility]A  Code Status:  Full Code  Allergies:   Allergies   Allergen Reactions    Nifedipine Shortness Of Breath    Penicillins Anaphylaxis    Amlodipine      Leg swelling    Metformin Other (See Comments)     Other reaction(s): Other - comment required  Kidney function affected  Kidney function affected       Diagnosis:    Patient Active Problem List   Diagnosis    ILD (interstitial lung disease) ([facility])    Obesity (BMI 30-39.9)    SOB (shortness of breath)    Pulmonary hypertension ([facility])    Stage 3b chronic kidney disease ([facility])    Secondary hypertension    Persistent proteinuria    Type 2 diabetes mellitus with stage 3b chronic kidney disease, without long-term current use of insulin ([facility])    Nephrotic syndrome    CKD (chronic kidney disease) stage 4, GFR 15-29 ml/min ([facility])    Primary hypertension    Dyslipidemia    Bilateral leg edema    Anasarca         Treatment:  Hemodialysis 1:1  Priority: Routine  Location: Acute Room    Diabetic: No  NPO: No  Isolation Precautions: None     Consent for Treatment Verified: Yes  Blood Consent Verified: Not Applicable     Safety Verified: Identify (I), Consent (C), Equipment (E), HepB Status (B), Orders Complete (O), Access Verified (A), and Timeliness (T)  Time out performed prior to access at 0821 hours.    Report Received from Primary RN at 0700 hours.  Primary RN (First Initial, Last Name, Title): LUANNE Quiroga RN  Incapacitated Nurse Education Completed: Not Applicable     HBsAg ONLY:  Date Drawn: May 20, 2025       Results: Negative  HBsAb:  Date Drawn:  May 20, 2025       Results: Immune >10    Order  Dialysis Bath  K+ (Potassium):  (uf only)  Ca+ (Calcium):  (uf only)  Na+ (Sodium): uf only  HCO3 (Bicarb): uf only  Bicarbonate Concentrate Lot No.: uf only  Acid Concentrate Lot No.: uf only     Na+ Modeling: Not Applicable  Dialyzer:  Locking Solution:  saline  Venous Catheter Locking Solution:  saline  Post-Treatment Procedures: Blood returned, Catheter Capped, clamped with Saline x2 ports  Machine Disinfection Process: Acid/Vinegar Clean, Heat Disinfect  Rinseback Volume (ml): 300 ml  Blood Volume Processed (Liters): 0 L  Dialyzer Clearance: Lightly streaked  Duration of Treatment (minutes): 180 minutes     Hemodialysis Intake (ml): 500 ml  Hemodialysis Output (ml): 2500 ml     Tolerated Treatment: Good  Patient Response to Treatment: well  Physician Notified: Yes       Provider Notification        Handoff complete and report given to Primary RN at 1048 hours.  Primary RN (First Initial, Last Name, Title):  LUANNE Quiroga RN     Education  Person Educated: Patient   Knowledge Base: Substantial  Barriers to Learning?: None  Preferred method of Learning: Oral  Topic(s): Access Care, Signs and Symptoms of Infection, Fluid Management, Procedural, Medications, Treatment Options, Potassium, and Diet   Teaching Tools: Explanation   Response to Education: Verbalized Understanding     Electronically signed by NI PEGUERO LPN on 5/22/2025 at 9:08 AM

## 2025-05-22 NOTE — PROGRESS NOTES
Nephrology Progress Note        [address] [place] [address]  Phone: [phone]  Office Hours: 8:30AM - 4:30PM  Monday - Friday 5/22/2025 7:01 AM  Subjective:   Admit Date: 5/16/2025  PCP: Willa Ravi MD  Interval History:   Very good UOP  LUE no longer swollen but RUE still swollen  Legs are less swollen    Diet: ADULT DIET; Regular; No Added Salt (3-4 gm); 1500 ml      Data:   Scheduled Meds:   vitamin D  50,000 Units Oral Weekly    calcitRIOL  0.25 mcg Oral Daily    potassium chloride  10 mEq IntraVENous Once    atorvastatin  80 mg Oral Nightly    cloNIDine  0.1 mg Oral Daily    empagliflozin  10 mg Oral Daily    hydrALAZINE  100 mg Oral BID    levothyroxine  25 mcg Oral QAM AC    sodium chloride flush  5-40 mL IntraVENous 2 times per day    enoxaparin  30 mg SubCUTAneous Daily     Continuous Infusions:   furosemide (LASIX) 100 mg in sodium chloride 0.9 % 100 mL infusion 5 mg/hr (05/21/25 1904)    sodium chloride       PRN Meds:hydrALAZINE, labetalol, sulfur hexafluoride microspheres, sodium chloride flush, sodium chloride, ondansetron **OR** ondansetron, polyethylene glycol, acetaminophen **OR** acetaminophen  I/O last 3 completed shifts:  In: 695 [P.O.:195]  Out: 8200 [Urine:5700]  No intake/output data recorded.    Intake/Output Summary (Last 24 hours) at 5/22/2025 0701  Last data filed at 5/22/2025 0607  Gross per 24 hour   Intake 695 ml   Output 6450 ml   Net -5755 ml       CBC: No results for input(s): \"WBC\", \"HGB\", \"PLT\" in the last 72 hours.    BMP:    Recent Labs     05/20/25  0524 05/21/25  0000    142   K 3.6 3.3*    107   CO2 23 22   BUN 23* 24*   CREATININE 3.0* 2.8*   GLUCOSE 168* 160*   CALCIUM 7.7* 7.8*         Objective:   Vitals: BP (!) 157/65   Pulse 77   Temp 97.9 °F (36.6 °C) (Oral)   Resp 18   Ht 1.651 m (5' 5\")   Wt 84.6 kg (186 lb 8.2 oz)   SpO2 94%   BMI 31.04 kg/m²   General appearance: alert and cooperative with exam, in no acute

## 2025-05-23 ENCOUNTER — APPOINTMENT (OUTPATIENT)
Dept: ULTRASOUND IMAGING | Age: 60
End: 2025-05-23
Payer: COMMERCIAL

## 2025-05-23 LAB
ANION GAP SERPL CALCULATED.3IONS-SCNC: 9 MMOL/L (ref 9–17)
BUN SERPL-MCNC: 24 MG/DL (ref 7–20)
CALCIUM SERPL-MCNC: 7.5 MG/DL (ref 8.3–10.6)
CHLORIDE SERPL-SCNC: 107 MMOL/L (ref 99–110)
CO2 SERPL-SCNC: 26 MMOL/L (ref 21–32)
CREAT SERPL-MCNC: 2.9 MG/DL (ref 0.6–1.1)
GFR, ESTIMATED: 17 ML/MIN/1.73M2
GLUCOSE SERPL-MCNC: 106 MG/DL (ref 74–99)
POTASSIUM SERPL-SCNC: 3.6 MMOL/L (ref 3.5–5.1)
SODIUM SERPL-SCNC: 142 MMOL/L (ref 136–145)

## 2025-05-23 PROCEDURE — 2500000003 HC RX 250 WO HCPCS: Performed by: INTERNAL MEDICINE

## 2025-05-23 PROCEDURE — 1200000000 HC SEMI PRIVATE

## 2025-05-23 PROCEDURE — 6370000000 HC RX 637 (ALT 250 FOR IP): Performed by: INTERNAL MEDICINE

## 2025-05-23 PROCEDURE — 6360000002 HC RX W HCPCS: Performed by: INTERNAL MEDICINE

## 2025-05-23 PROCEDURE — 90935 HEMODIALYSIS ONE EVALUATION: CPT

## 2025-05-23 PROCEDURE — 80048 BASIC METABOLIC PNL TOTAL CA: CPT

## 2025-05-23 PROCEDURE — 94761 N-INVAS EAR/PLS OXIMETRY MLT: CPT

## 2025-05-23 PROCEDURE — 93971 EXTREMITY STUDY: CPT

## 2025-05-23 RX ORDER — ISOSORBIDE MONONITRATE 60 MG/1
60 TABLET, EXTENDED RELEASE ORAL DAILY
Status: DISCONTINUED | OUTPATIENT
Start: 2025-05-23 | End: 2025-05-24 | Stop reason: HOSPADM

## 2025-05-23 RX ORDER — BUMETANIDE 1 MG/1
2 TABLET ORAL 2 TIMES DAILY
Status: DISCONTINUED | OUTPATIENT
Start: 2025-05-23 | End: 2025-05-24 | Stop reason: HOSPADM

## 2025-05-23 RX ADMIN — CLONIDINE HYDROCHLORIDE 0.1 MG: 0.1 TABLET ORAL at 11:23

## 2025-05-23 RX ADMIN — CALCITRIOL CAPSULES 0.25 MCG 0.25 MCG: 0.25 CAPSULE ORAL at 11:23

## 2025-05-23 RX ADMIN — HYDRALAZINE HYDROCHLORIDE 100 MG: 50 TABLET ORAL at 11:23

## 2025-05-23 RX ADMIN — ISOSORBIDE MONONITRATE 60 MG: 60 TABLET, EXTENDED RELEASE ORAL at 11:22

## 2025-05-23 RX ADMIN — BUMETANIDE 2 MG: 1 TABLET ORAL at 14:31

## 2025-05-23 RX ADMIN — ENOXAPARIN SODIUM 30 MG: 100 INJECTION SUBCUTANEOUS at 11:24

## 2025-05-23 RX ADMIN — EMPAGLIFLOZIN 10 MG: 10 TABLET, FILM COATED ORAL at 11:23

## 2025-05-23 RX ADMIN — SODIUM CHLORIDE, PRESERVATIVE FREE 10 ML: 5 INJECTION INTRAVENOUS at 11:36

## 2025-05-23 RX ADMIN — POTASSIUM CHLORIDE 20 MEQ: 1500 TABLET, EXTENDED RELEASE ORAL at 17:04

## 2025-05-23 RX ADMIN — ATORVASTATIN CALCIUM 80 MG: 40 TABLET, FILM COATED ORAL at 21:46

## 2025-05-23 RX ADMIN — POTASSIUM CHLORIDE 20 MEQ: 1500 TABLET, EXTENDED RELEASE ORAL at 11:23

## 2025-05-23 RX ADMIN — SODIUM CHLORIDE, PRESERVATIVE FREE 10 ML: 5 INJECTION INTRAVENOUS at 21:47

## 2025-05-23 RX ADMIN — HYDRALAZINE HYDROCHLORIDE 100 MG: 50 TABLET ORAL at 21:46

## 2025-05-23 NOTE — CONSULTS
Rounding completed - sterile dressing change to LUE MST MidLine with CHG scrub, SkinPrep, 3M Securing Device/CHG gel DSSG, and new LuerLok cap/SwabCap. Site returns blood briskly and flushes without resistance/abnormalities. Pt tolerated well and denies other c/o or needs.

## 2025-05-23 NOTE — PLAN OF CARE
Problem: Chronic Conditions and Co-morbidities  Goal: Patient's chronic conditions and co-morbidity symptoms are monitored and maintained or improved  5/22/2025 2328 by Alisa Rea RN  Outcome: Progressing  5/22/2025 1721 by Sandra Roe LPN  Outcome: Progressing     Problem: Discharge Planning  Goal: Discharge to home or other facility with appropriate resources  5/22/2025 2328 by Alisa Rea RN  Outcome: Progressing  5/22/2025 1721 by Sandra Roe LPN  Outcome: Progressing     Problem: Pain  Goal: Verbalizes/displays adequate comfort level or baseline comfort level  5/22/2025 2328 by Alisa Rea RN  Outcome: Progressing  5/22/2025 1721 by Sandra Roe LPN  Outcome: Progressing     Problem: Safety - Adult  Goal: Free from fall injury  5/22/2025 2328 by Alisa Rea RN  Outcome: Progressing  5/22/2025 1721 by Sandra Roe LPN  Outcome: Progressing     Problem: Neurosensory - Adult  Goal: Achieves stable or improved neurological status  5/22/2025 2328 by Alisa Rea RN  Outcome: Progressing  5/22/2025 1721 by Sandra Roe LPN  Outcome: Progressing  Goal: Achieves maximal functionality and self care  5/22/2025 2328 by Alisa Rea RN  Outcome: Progressing  5/22/2025 1721 by Sandra Roe LPN  Outcome: Progressing     Problem: Respiratory - Adult  Goal: Achieves optimal ventilation and oxygenation  5/22/2025 2328 by Alisa Rea RN  Outcome: Progressing  5/22/2025 1721 by Sandra Roe LPN  Outcome: Progressing     Problem: Cardiovascular - Adult  Goal: Maintains optimal cardiac output and hemodynamic stability  5/22/2025 2328 by Alisa Rea RN  Outcome: Progressing  5/22/2025 1721 by Sandra Roe LPN  Outcome: Progressing     Problem: Skin/Tissue Integrity - Adult  Goal: Skin integrity remains intact  5/22/2025 2328 by Alisa Rea RN  Outcome: Progressing  5/22/2025 1721 by Sandra Roe LPN  Outcome: Progressing  Goal: Incisions, wounds, or

## 2025-05-23 NOTE — PROGRESS NOTES
Pt tolerated a 3hr tx well. 2.5L of fluid was removed. Hd cath was used for tx. Hd cath ran with no issues. Both lumens were locked with normal saline and capped. Pt voiced no new needs. Pt returned to room via transport.          Patient Name: Angela Pabon  Patient : 1965  MRN: [id number]     Acct: [id number]  Date of Admission: 2025  Room/Bed: [facility]6/[facility]6A  Code Status:  Full Code  Allergies:   Allergies   Allergen Reactions    Nifedipine Shortness Of Breath    Penicillins Anaphylaxis    Amlodipine      Leg swelling    Metformin Other (See Comments)     Other reaction(s): Other - comment required  Kidney function affected  Kidney function affected       Diagnosis:    Patient Active Problem List   Diagnosis    ILD (interstitial lung disease) ([facility])    Obesity (BMI 30-39.9)    SOB (shortness of breath)    Pulmonary hypertension ([facility])    Stage 3b chronic kidney disease ([facility])    Secondary hypertension    Persistent proteinuria    Type 2 diabetes mellitus with stage 3b chronic kidney disease, without long-term current use of insulin ([facility])    Nephrotic syndrome    CKD (chronic kidney disease) stage 4, GFR 15-29 ml/min ([facility])    Primary hypertension    Dyslipidemia    Bilateral leg edema    Anasarca         Treatment:  Hemodilaysis 2:1  Priority: Routine  Location: Acute Room    Diabetic: Yes  NPO: No  Isolation Precautions: None     Consent for Treatment Verified: Yes  Blood Consent Verified: Not Applicable     Safety Verified: Identify (I), Consent (C), Equipment (E), HepB Status (B), Orders Complete (O), Access Verified (A), and Timeliness (T)  Time out performed prior to access at 0725 hours.    Report Received from Primary RN at 0700 hours.  Primary RN (First Initial, Last Name, Title): LUANNE Quiroga RN   Incapacitated Nurse Education Completed: Yes     HBsAg ONLY:  Date Drawn: May 20, 2025       Results: Negative  HBsAb:  Date Drawn:  May 20, 2025       Results: Immune >10    Order  Dialysis Bath  K+  [id number]   Machine Number: [id number]  Dialyzer Lot No.: [id number]  RO Machine Log Sheet Completed: Yes  Machine Alarm Self Test: Completed;Passed (05/23/25 0645)     Air Foam Detector: Tested, Proper Function, pH Reading  Extracorporeal Circuit Tested for Integrity: Yes  Machine Conductivity: 14  Manual Conductivity: 13.9     Bicarbonate Concentrate Lot No.:  [id number])  Acid Concentrate Lot No.:  ([id number])  Manual Ph: 7.4  Bleach Test (Neg): Yes  Bath Temperature:  (ultrafiltration)  Tubing Lot#: [id number]  Conductivity Meter Serial #: [id number]  All Connections Secure?: Yes  Venous Parameters Set?: Yes  Arterial Parameters Set?: Yes  Saline Line Double Clamped?: Yes  Air Foam Detector Engaged?: Yes  Machine Functioning Alarm Free? Yes  Prime Given: 200ml    Chlorine Testing - Before each treatment and every 4 hours:   Treatment  Time On: 0732  Time Off: 1033  Treatment Goal: 3hrs 2.5 L  Weight - Scale: 85.7 kg (188 lb 15 oz) (05/23/25 0228)  1st check: less than 0.1 ppm at: 0630 hours  2nd check: less than 0.1 ppm at: 1025 hours  3rd check: Not Applicable  (if greater than 0.1 ppm, then check every 30 minutes from secondary)    Access Flows and Pressures  Patient Vitals for the past 8 hrs:   Blood Flow Rate (ml/min) Ultrafiltration Rate (ml/hr) Ultrafiltration Removed (ml) Arterial Pressure (mmHg) Venous Pressure (mmHg) TMP DFR Comments Access Visible   05/23/25 0732 300 ml/min 1000 ml/hr -- -50 mmHg 30 20 0 Tx initiated Yes   05/23/25 0745 300 ml/min 1000 ml/hr 246 ml -90 mmHg 60 20 0 Nephrologist talking to pt Yes   05/23/25 0800 300 ml/min 1000 ml/hr 477 ml -80 mmHg 70 30 0 pt resting with no needs Yes   05/23/25 0815 300 ml/min 1000 ml/hr 720 ml -100 mmHg 70 30 0 no needs voiced Yes   05/23/25 0830 300 ml/min 1000 ml/hr 961 ml -90 mmHg 70 30 0 lines secure Yes   05/23/25 0845 300 ml/min 1000 ml/hr 1210 ml -100 mmHg 70 30 0 pt resting with no needs Yes   05/23/25 0900 300 ml/min 1000 ml/hr 1475 ml -80 mmHg 70 40 0

## 2025-05-23 NOTE — PROGRESS NOTES
V2.0    [facility] [place]      Name:  Angela Pabon /Age/Sex: 1965  (60 y.o. female)   MRN & CSN:  [id number] & [id number] Encounter Date/Time: 2025 8:04 AM EDT   Location:  [address] [place], [place] [address]- PCP: Willa Ravi MD     Attending:Yaima Gandhi MD       Hospital Day: 8    Assessment and Recommendations   Angela Pabon is a 60 y.o. female  who presents with Anasarca      Plan:  Volume overload with anasarca in the setting of MARCO on CKD: Initially started on Lasix IV 40 mg ultimately changed to Lasix drip.  Nephrology was consulted.  Underwent hemodialysis with ultrafiltration.  Last hemodialysis ultrafiltration to be done today.  Consideration of temporary catheter to be placed tunneled as well today.  Likely plan for discharge tomorrow.  Significant weight reduction with a net negative of -20 L.  Currently on telemetry.  Echocardiogram showed ejection fraction of 55% with RVSP of 47 and dilated IVC that decreases to less than 50% during inspiration.  Nephrology plans for Bumex 2 mg p.o. twice daily along with potassium chloride p.o. 20 mEq twice daily on discharge.  Hypokalemia repleted during hospital stay  Benign essential hypertension on clonidine hydrochlorothiazide hydralazine hydralazine and labetalol IV as needed  Anemia of chronic disease with a hemoglobin of 8.7 on admission.  Current hemoglobin is 9.3.  Follow-up outpatient with nephrology and PCP        Diet ADULT DIET; Regular; No Added Salt (3-4 gm); 1500 ml   DVT Prophylaxis [] Lovenox, [x]  Heparin, [] SCDs, [] Ambulation,  [] Eliquis, [] Xarelto  [] Coumadin   Code Status Full Code   Disposition From: Home  Expected Disposition: Home  Estimated Date of Discharge: Likely tomorrow  Patient requires continued admission due to Alysis plan today   Surrogate Decision Maker/ POA       Personally reviewed Lab Studies and Imaging     Discussed management of the case with nephrology recommended inpatient dialysis daily except  Sunday      Drugs that require monitoring for toxicity include Lasix  and the method of monitoring was creat and electrolytes        Subjective:     Chief Complaint: \" I am still swollen.\"     Angela Pabon is a 60 y.o. female who presents with CHF.      The was seen in the dialysis all questions and concerns were addressed.  Feeling better and has significant weight reduction with dialysis.  Nephrology has mentioned to the patient for likely discharge plan for tomorrow  Review of Systems:      Pertinent positives and negatives discussed in HPI    Objective:     Intake/Output Summary (Last 24 hours) at 5/23/2025 0933  Last data filed at 5/23/2025 0710  Gross per 24 hour   Intake 500 ml   Output 5400 ml   Net -4900 ml      Vitals:   Vitals:    05/23/25 0830 05/23/25 0845 05/23/25 0900 05/23/25 0915   BP: (!) 160/68 (!) 154/64 (!) 156/68 (!) 174/84   Pulse:       Resp: 14 10 17 13   Temp:       TempSrc:       SpO2:       Weight:       Height:             Physical Exam:      General: NAD. Morbid obese  Eyes: EOMI  ENT: neck supple  Cardiovascular: Regular rate.  Respiratory: Cracklers to auscultation  Gastrointestinal: Soft, non tender  Genitourinary: no suprapubic tenderness  Musculoskeletal: 2+ edema  Skin: warm, dry  Neuro: Alert.  Psych: Mood appropriate.         Medications:   Medications:    bumetanide  2 mg Oral BID    isosorbide mononitrate  60 mg Oral Daily    potassium chloride  20 mEq Oral BID WC    vitamin D  50,000 Units Oral Weekly    calcitRIOL  0.25 mcg Oral Daily    atorvastatin  80 mg Oral Nightly    cloNIDine  0.1 mg Oral Daily    empagliflozin  10 mg Oral Daily    hydrALAZINE  100 mg Oral BID    sodium chloride flush  5-40 mL IntraVENous 2 times per day    enoxaparin  30 mg SubCUTAneous Daily      Infusions:    sodium chloride       PRN Meds: hydrALAZINE, 10 mg, Q4H PRN  labetalol, 10 mg, Q4H PRN  sulfur hexafluoride microspheres, 2 mL, ONCE PRN  sodium chloride flush, 5-40 mL, PRN  sodium  WBCUA 4 05/16/2025 05:42 PM    RBCUA 3 05/16/2025 05:42 PM    MUCUS RARE 05/16/2025 05:42 PM    BACTERIA None seen 07/12/2024 08:30 AM    CLARITYU Clear 07/12/2024 08:30 AM    LEUKOCYTESUR NEGATIVE 05/16/2025 05:42 PM    UROBILINOGEN 0.2 05/16/2025 05:42 PM    BILIRUBINUR NEGATIVE 05/16/2025 05:42 PM    BLOODU Negative 07/12/2024 08:30 AM    GLUCOSEU 100 05/16/2025 05:42 PM    KETUA NEGATIVE 05/16/2025 05:42 PM     Urine Cultures: No results found for: \"LABURIN\"  Blood Cultures: No results found for: \"BC\"  No results found for: \"BLOODCULT2\"  Organism: No results found for: \"ORG\"      Electronically signed by Yaima Gandhi MD on 5/23/2025

## 2025-05-23 NOTE — PROGRESS NOTES
Nephrology  Dialysis Note        [address] [id number]  Phone: [phone]  Office Hours: 8:30AM - 4:30PM  Monday - Friday          PROCEDURE:  Patient seen during hemodialysis      PHYSICIAN:  ERMA      INDICATION:  HYPERVOLEMIA      RX:  See dialysis flowsheet for specifics on access, blood flow rate, dialysate baths, duration of dialysis, anticoagulation and other technical information.      COMMENTS:  HYPERVOLEMIA HAS PRETTY MUCH RESOLVED WITH THE DAILY UF AND THE LASIX DRIP SO REMOVE TEMP HD CATH AFTER HD TODAY    GIVE BUMEX 2MG PO BID KCL 20MEQ PO BID ON DC    OK TO DC ON SATURDAY PER RENAL STDPT    NO HD NEEDED AS OUTPT FOR NOW, SO REMOVE THE TEMP HD CATH BEFORE DC    THANK YOU        Electronically signed by Fili Colon DO on 5/23/2025 at 7:51 AM

## 2025-05-23 NOTE — PROGRESS NOTES
Comprehensive Nutrition Assessment    Type and Reason for Visit:  Initial, LOS    Nutrition Recommendations/Plan:   Continue current no added salt diet with 1500 ml fluid restriction   Will continue to follow up during stay     Malnutrition Assessment:  Malnutrition Status:  Insufficient data (05/23/25 1109)    Context:  Acute Illness       Nutrition Assessment:    Admit with edema, shortness of breath, temporary dialysis during stay. Hx DM,CKD, CHF. Has been on no added salt diet with 1500 ml fluid restriction. Patient out of room/off unit on visit. Copy of low sodium, fluid restricted meal plan from nutrition care manual left in room. Meal intake 50-75% per available po data. Significant weight loss during stay, intentional with dialysis and treating edema. Will follow at low nutrition risk at this time.    Nutrition Related Findings:    out of room/off unit, plan for temp HD cath removal Wound Type: None       Current Nutrition Intake & Therapies:    Average Meal Intake: 51-75%  Average Supplements Intake: None Ordered  ADULT DIET; Regular; No Added Salt (3-4 gm); 1500 ml    Anthropometric Measures:  Height: 165.1 cm (5' 5\")  Ideal Body Weight (IBW): 125 lbs (57 kg)    Admission Body Weight: 112.9 kg (248 lb 14.4 oz) (bed)  Current Body Weight: 85.7 kg (188 lb 15 oz), 151.1 % IBW. Weight Source: Bed scale  Current BMI (kg/m2): 31.4  Usual Body Weight: 86.6 kg (191 lb) (7/23/24)     % Weight Change (Calculated): -1.1  Weight Adjustment For: No Adjustment                 BMI Categories: Obese Class 1 (BMI 30.0-34.9)    Estimated Daily Nutrient Needs:  Energy Requirements Based On: Formula  Weight Used for Energy Requirements: Current  Energy (kcal/day): 1577 (mifflin st jeor)  Weight Used for Protein Requirements: Ideal  Protein (g/day): 57-68 (1-1.2 g/kg)  Method Used for Fluid Requirements: 1 ml/kcal  Fluid (ml/day): 1500    Nutrition Diagnosis:   No nutrition diagnosis at this time related to   as evidenced by       Nutrition Interventions:   Food and/or Nutrient Delivery: Continue Current Diet  Nutrition Education/Counseling: Education/Counseling initiated  Coordination of Nutrition Care: Continue to monitor while inpatient  Plan of Care discussed with: n/a    Goals:  Goals: PO intake 75% or greater  Type of Goal: New goal  Previous Goal Met: New Goal    Nutrition Monitoring and Evaluation:   Behavioral-Environmental Outcomes: None Identified  Food/Nutrient Intake Outcomes: Food and Nutrient Intake  Physical Signs/Symptoms Outcomes: Biochemical Data, Skin, Weight, Fluid Status or Edema, Meal Time Behavior, Nutrition Focused Physical Findings    Discharge Planning:    Continue current diet     Wanda Crews RD, LD  Contact: [phone]

## 2025-05-24 VITALS
HEART RATE: 110 BPM | WEIGHT: 188.91 LBS | DIASTOLIC BLOOD PRESSURE: 60 MMHG | BODY MASS INDEX: 31.47 KG/M2 | HEIGHT: 65 IN | RESPIRATION RATE: 15 BRPM | SYSTOLIC BLOOD PRESSURE: 144 MMHG | TEMPERATURE: 98.4 F | OXYGEN SATURATION: 95 %

## 2025-05-24 LAB
ANION GAP SERPL CALCULATED.3IONS-SCNC: 9 MMOL/L (ref 9–17)
BUN SERPL-MCNC: 25 MG/DL (ref 7–20)
CALCIUM SERPL-MCNC: 7.6 MG/DL (ref 8.3–10.6)
CHLORIDE SERPL-SCNC: 107 MMOL/L (ref 99–110)
CO2 SERPL-SCNC: 26 MMOL/L (ref 21–32)
CREAT SERPL-MCNC: 2.8 MG/DL (ref 0.6–1.1)
GFR, ESTIMATED: 18 ML/MIN/1.73M2
GLUCOSE SERPL-MCNC: 100 MG/DL (ref 74–99)
POTASSIUM SERPL-SCNC: 3.8 MMOL/L (ref 3.5–5.1)
SODIUM SERPL-SCNC: 142 MMOL/L (ref 136–145)

## 2025-05-24 PROCEDURE — 6370000000 HC RX 637 (ALT 250 FOR IP): Performed by: INTERNAL MEDICINE

## 2025-05-24 PROCEDURE — 2500000003 HC RX 250 WO HCPCS: Performed by: INTERNAL MEDICINE

## 2025-05-24 PROCEDURE — 94761 N-INVAS EAR/PLS OXIMETRY MLT: CPT

## 2025-05-24 PROCEDURE — 6360000002 HC RX W HCPCS: Performed by: INTERNAL MEDICINE

## 2025-05-24 PROCEDURE — 80048 BASIC METABOLIC PNL TOTAL CA: CPT

## 2025-05-24 RX ORDER — CALCITRIOL 0.25 UG/1
0.25 CAPSULE, LIQUID FILLED ORAL DAILY
Qty: 30 CAPSULE | Refills: 3 | Status: SHIPPED | OUTPATIENT
Start: 2025-05-25

## 2025-05-24 RX ORDER — BUMETANIDE 2 MG/1
2 TABLET ORAL 2 TIMES DAILY
Qty: 30 TABLET | Refills: 3 | Status: SHIPPED | OUTPATIENT
Start: 2025-05-24

## 2025-05-24 RX ORDER — ISOSORBIDE MONONITRATE 60 MG/1
60 TABLET, EXTENDED RELEASE ORAL DAILY
Qty: 30 TABLET | Refills: 3 | Status: SHIPPED | OUTPATIENT
Start: 2025-05-25

## 2025-05-24 RX ADMIN — ISOSORBIDE MONONITRATE 60 MG: 60 TABLET, EXTENDED RELEASE ORAL at 09:40

## 2025-05-24 RX ADMIN — SODIUM CHLORIDE, PRESERVATIVE FREE 10 ML: 5 INJECTION INTRAVENOUS at 09:41

## 2025-05-24 RX ADMIN — HYDRALAZINE HYDROCHLORIDE 100 MG: 50 TABLET ORAL at 09:40

## 2025-05-24 RX ADMIN — BUMETANIDE 2 MG: 1 TABLET ORAL at 09:40

## 2025-05-24 RX ADMIN — CALCITRIOL CAPSULES 0.25 MCG 0.25 MCG: 0.25 CAPSULE ORAL at 09:40

## 2025-05-24 RX ADMIN — EMPAGLIFLOZIN 10 MG: 10 TABLET, FILM COATED ORAL at 09:40

## 2025-05-24 RX ADMIN — ENOXAPARIN SODIUM 30 MG: 100 INJECTION SUBCUTANEOUS at 09:41

## 2025-05-24 RX ADMIN — POTASSIUM CHLORIDE 20 MEQ: 1500 TABLET, EXTENDED RELEASE ORAL at 09:40

## 2025-05-24 RX ADMIN — CLONIDINE HYDROCHLORIDE 0.1 MG: 0.1 TABLET ORAL at 09:40

## 2025-05-24 NOTE — PROGRESS NOTES
Nephrology Progress Note        [address] [place] [address]  Phone: [phone]  Office Hours: 8:30AM - 4:30PM  Monday - Friday 5/24/2025 8:06 AM  Subjective:   Admit Date: 5/16/2025  PCP: Willa Ravi MD  Interval History:   On room air  RUE swelling  No leg edema      Diet: ADULT DIET; Regular; No Added Salt (3-4 gm); 1500 ml      Data:   Scheduled Meds:   bumetanide  2 mg Oral BID    isosorbide mononitrate  60 mg Oral Daily    potassium chloride  20 mEq Oral BID WC    vitamin D  50,000 Units Oral Weekly    calcitRIOL  0.25 mcg Oral Daily    atorvastatin  80 mg Oral Nightly    cloNIDine  0.1 mg Oral Daily    empagliflozin  10 mg Oral Daily    hydrALAZINE  100 mg Oral BID    sodium chloride flush  5-40 mL IntraVENous 2 times per day    enoxaparin  30 mg SubCUTAneous Daily     Continuous Infusions:   sodium chloride       PRN Meds:hydrALAZINE, labetalol, sulfur hexafluoride microspheres, sodium chloride flush, sodium chloride, ondansetron **OR** ondansetron, polyethylene glycol, acetaminophen **OR** acetaminophen  I/O last 3 completed shifts:  In: 1482 [P.O.:982]  Out: 8400 [Urine:5400]  No intake/output data recorded.    Intake/Output Summary (Last 24 hours) at 5/24/2025 0806  Last data filed at 5/24/2025 0433  Gross per 24 hour   Intake 1482 ml   Output 5900 ml   Net -4418 ml       CBC: No results for input(s): \"WBC\", \"HGB\", \"PLT\" in the last 72 hours.    BMP:    Recent Labs     05/22/25  0607 05/23/25  0528 05/24/25  0450    142 142   K 3.6 3.6 3.8    107 107   CO2 24 26 26   BUN 23* 24* 25*   CREATININE 2.9* 2.9* 2.8*   GLUCOSE 108* 106* 100*   CALCIUM 7.8* 7.5* 7.6*     Hepatic:   Recent Labs     05/22/25  0607   AST 25   ALT 24   BILITOT 1.0   ALKPHOS 111       Objective:   Vitals: BP (!) 148/62   Pulse (!) 110   Temp 98.4 °F (36.9 °C) (Oral)   Resp 13   Ht 1.651 m (5' 5\")   Wt 85.7 kg (188 lb 14.6 oz)   SpO2 94%   BMI 31.44 kg/m²   General appearance: alert and

## 2025-05-24 NOTE — PROGRESS NOTES
Pt IV removed and bandage placed. Patient being taken home via [facility]. All discharge instructions reviewed and all questions asked and answered.

## 2025-05-24 NOTE — DISCHARGE SUMMARY
V2.0  Discharge Summary    Name:  Angela Pabon /Age/Sex: 1965 (60 y.o. female)   Admit Date: 2025  Discharge Date: 25    MRN & CSN:  [id number] & [id number] Encounter Date and Time 25 10:08 AM EDT    Attending:  Yaima Gandhi MD Discharging Provider: Yaima Gandhi MD       Hospital Course:     Brief HPI: Angela Pabon is a 60 y.o. female who presented with anasarca    Brief Hospital course summary:   60-year-old female with underlying history of CKD presented to the hospital with volume overload and anasarca.  Nephrology was consulted who ultimately started the patient on Lasix 40 mg IV twice daily which was ultimately changed to Lasix drip.  Patient had significant diuresis ultimately achieving weight of 188 pounds with significant diuresis.  Required multiple sessions of ultrafiltration and hemodialysis.  Tolerated the procedures well.  Nephrology has a plan for discharge with Bumex 2 mg p.o. twice daily along with potassium to be taken at home.  We will discontinue hydrochlorothiazide and start the patient on Imdur and calcitriol as per nephrology we will continue with hydralazine for home along with clonidine for blood pressure control.    On med rec conciliation I have identified that the patient had a mild elevation in TSH and patient is does not take any levothyroxine at home so I have discontinued levothyroxine from the home medications.  Also will discontinue Lasix and hydrochlorothiazide from the home medications.    Medically stable for discharge home with recommendation to follow-up with nephrology outpatient    Patient was also identified to have edema of the upper extremity with ultrasound showing signs of DVT.  Discussed with nephrology we will start the patient on 10 mg twice daily of Eliquis for 7 days ultimately changed to 5 mg twice daily of Eliquis.  Patient is aware with no history of bleeding in the past.  Medically stable for discharge home    Assessment and short  Labs     05/22/25  0607   AST 25   ALT 24   BILITOT 1.0   ALKPHOS 111     Lipids:   Lab Results   Component Value Date/Time    CHOL 187 05/06/2025 10:09 AM    HDL 63 05/06/2025 10:09 AM    TRIG 81 05/06/2025 10:09 AM     Hemoglobin A1C:   Lab Results   Component Value Date/Time    LABA1C 5.0 05/06/2025 09:49 AM     TSH:   Lab Results   Component Value Date/Time    TSH 5.73 05/06/2025 10:09 AM     Troponin: No results found for: \"TROPONINT\"  Lactic Acid: No results for input(s): \"LACTA\" in the last 72 hours.  BNP: No results for input(s): \"PROBNP\" in the last 72 hours.  UA:  Lab Results   Component Value Date/Time    NITRU NEGATIVE 05/16/2025 05:42 PM    COLORU Yellow 05/16/2025 05:42 PM    PHUR 6.0 05/16/2025 05:42 PM    LABCAST None seen 07/12/2024 08:30 AM    WBCUA 4 05/16/2025 05:42 PM    RBCUA 3 05/16/2025 05:42 PM    MUCUS RARE 05/16/2025 05:42 PM    BACTERIA None seen 07/12/2024 08:30 AM    CLARITYU Clear 07/12/2024 08:30 AM    LEUKOCYTESUR NEGATIVE 05/16/2025 05:42 PM    UROBILINOGEN 0.2 05/16/2025 05:42 PM    BILIRUBINUR NEGATIVE 05/16/2025 05:42 PM    BLOODU Negative 07/12/2024 08:30 AM    GLUCOSEU 100 05/16/2025 05:42 PM    KETUA NEGATIVE 05/16/2025 05:42 PM     Urine Cultures: No results found for: \"LABURIN\"  Blood Cultures: No results found for: \"BC\"  No results found for: \"BLOODCULT2\"  Organism: No results found for: \"ORG\"    Time Spent Discharging patient :    Estimated time spent for medical decision-making encompassing complexity of the case, history taking, medication review, physical examination, communication with family, RN, , discussion with specialists, and ancillary staff members to provide accurate care for the patient was around 35 minutes.    The billing in this case is level 3 due to the combination of above and specifically because of complexity of the case.    Electronically signed by Yaima Gandhi MD on 5/24/2025 at 10:08 AM

## 2025-05-24 NOTE — DISCHARGE INSTR - DIET

## 2025-05-24 NOTE — PLAN OF CARE
Problem: Chronic Conditions and Co-morbidities  Goal: Patient's chronic conditions and co-morbidity symptoms are monitored and maintained or improved  5/24/2025 0828 by Robyn Oreilly LPN  Outcome: Progressing  5/24/2025 0633 by Yulia Marcus RN  Outcome: Progressing

## 2025-06-01 NOTE — PROGRESS NOTES
Physician Progress Note      PATIENT:               KERRI LEVIN  CSN #:                  [id number]  :                       1965  ADMIT DATE:       2025 10:12 AM  DISCH DATE:        2025 12:20 PM  RESPONDING  PROVIDER #:        Yaima Gandhi MD          QUERY TEXT:    Dear Dr. Gandhi,   PCP Progress note \" acute diastolic CHF\" was documented in the medical   record.  The diagnosis was not noted in subsequent documentation.  Please   clarify the status of this condition.    The clinical indicators include:  Hx HTN, CKD   ED for SOB, 50 lb wt gain, elevated Pro-BNP=20,149, creat=2.9  ED notes \"Pitting edema consistent with anasarca taking breast every 3 words  due to shortness of breath.\"  out of her HCTZ and Lasix since March,  ED notes \"Concern for acute CHF exacerbation\"  H and P notes \"Likely combination of CKD and CHF and her being off of her  diuretics\"  treated with IV Lasix, Lasix gtt, I and O, Echo, monitor labs, fluid  restrictions, discharged on Bumex   CXR-groundglass increased density in both lung bases greater on the  right. Differentials would include developing infiltrate or congestion.  Echo shows EF=55-60% and Grade II diastolic dysfunction.   PCP notes This patient is in acute diastolic CHF/HFpEF.  Options provided:  -- Acute diastolic CHF confirmed after study  -- Acute diastolic CHF treated and resolved  -- Acute diastolic CHF ruled out after study  -- Other - I will add my own diagnosis  -- Disagree - Not applicable / Not valid  -- Disagree - Clinically unable to determine / Unknown  -- Refer to Clinical Documentation Reviewer    PROVIDER RESPONSE TEXT:    Acute diastolic CHF treated and resolved.    Query created by: Eloise Reid on 2025 10:42 AM      QUERY TEXT:    Dear Dr. Gandih,  The attending physician is required to clarify conflicting documentation in   the medical record.  Noted documentation of   PCP notes \" Acute kidney   injury was

## 2025-06-12 ENCOUNTER — TELEPHONE (OUTPATIENT)
Dept: INTERNAL MEDICINE CLINIC | Age: 60
End: 2025-06-12

## 2025-06-12 DIAGNOSIS — N18.32 TYPE 2 DIABETES MELLITUS WITH STAGE 3B CHRONIC KIDNEY DISEASE, WITHOUT LONG-TERM CURRENT USE OF INSULIN (HCC): Primary | ICD-10-CM

## 2025-06-12 DIAGNOSIS — E11.22 TYPE 2 DIABETES MELLITUS WITH STAGE 3B CHRONIC KIDNEY DISEASE, WITHOUT LONG-TERM CURRENT USE OF INSULIN (HCC): Primary | ICD-10-CM

## 2025-06-12 RX ORDER — GLUCOSAMINE HCL/CHONDROITIN SU 500-400 MG
CAPSULE ORAL
Qty: 100 STRIP | Refills: 3 | Status: SHIPPED | OUTPATIENT
Start: 2025-06-12

## 2025-06-12 RX ORDER — BLOOD-GLUCOSE METER
1 KIT MISCELLANEOUS DAILY
Qty: 1 KIT | Refills: 0 | Status: SHIPPED | OUTPATIENT
Start: 2025-06-12

## 2025-06-12 NOTE — TELEPHONE ENCOUNTER
Patient called needing blood Glucose monitor and test  strips. Patient stated that the one she had broke and has no way to check her sugar. I do not see the items in the medication list. She wants them to go to [facility] in [place].

## 2025-07-21 ENCOUNTER — TELEPHONE (OUTPATIENT)
Dept: INTERNAL MEDICINE CLINIC | Age: 60
End: 2025-07-21

## 2025-07-21 NOTE — TELEPHONE ENCOUNTER
Patient called stating that she was put on Eloquis in the hospital for a blood clot in her arm. Patient was sent home with a script and now has ran out. Patient wants to know if she is finished taking Eloquis? Or Will be needing a refill?

## 2025-07-23 NOTE — TELEPHONE ENCOUNTER
Patient called back to respond to LUTHER Bustillos.  Patient stated that she has been off of Eloquis for 2 weeks now. Been on Eloquis since May 24 of this year. 2 pills 2x's daily. Patient did not have any imaging done after the hospital stay. Patient stated that their are no swelling, pain, redness or fever in the arm. She stated that she feels fine.